# Patient Record
Sex: FEMALE | Race: WHITE | NOT HISPANIC OR LATINO | Employment: FULL TIME | URBAN - METROPOLITAN AREA
[De-identification: names, ages, dates, MRNs, and addresses within clinical notes are randomized per-mention and may not be internally consistent; named-entity substitution may affect disease eponyms.]

---

## 2021-02-16 RX ORDER — TRAZODONE HYDROCHLORIDE 150 MG/1
150 TABLET ORAL
COMMUNITY

## 2021-02-16 RX ORDER — BUPROPION HYDROCHLORIDE 300 MG/1
300 TABLET ORAL DAILY
COMMUNITY

## 2021-02-16 RX ORDER — ROSUVASTATIN CALCIUM 10 MG/1
10 TABLET, COATED ORAL DAILY
COMMUNITY

## 2021-02-16 RX ORDER — LISINOPRIL AND HYDROCHLOROTHIAZIDE 12.5; 1 MG/1; MG/1
1 TABLET ORAL DAILY
COMMUNITY

## 2021-02-16 RX ORDER — MELATONIN
2000 DAILY
COMMUNITY

## 2021-02-16 NOTE — PRE-PROCEDURE INSTRUCTIONS
Pre-Surgery Instructions:   Medication Instructions    buPROPion (WELLBUTRIN XL) 300 mg 24 hr tablet Take as directed    cholecalciferol (VITAMIN D3) 1,000 units tablet Hold for one week prior to surgery    lisinopril-hydrochlorothiazide (PRINZIDE,ZESTORETIC) 10-12 5 MG per tablet Hold morning of surgery    rosuvastatin (CRESTOR) 10 MG tablet Take as directed    traZODone (DESYREL) 150 mg tablet Take as directed      My Surgical Experience    The following information was developed to assist you to prepare for your operation  What do I need to do before coming to the hospital?   Arrange for a responsible person to drive you to and from the hospital    Arrange care for your children at home  Children are not allowed in the recovery areas of the hospital   Plan to wear clothing that is easy to put on and take off  If you are having shoulder surgery, wear a shirt that buttons or zippers in the front  Bathing  o Shower the evening before and the morning of your surgery with an antibacterial soap  Please refer to the Pre Op Showering Instructions for Surgery Patients Sheet   o Remove nail polish and all body piercing jewelry  o Do not shave any body part for at least 24 hours before surgery-this includes face, arms, legs and upper body  Food  o Nothing to eat or drink after midnight the night before your surgery  This includes candy and chewing gum  o Exception: If your surgery is after 12:00pm (noon), you may have clear liquids such as 7-Up®, ginger ale, apple or cranberry juice, Jell-O®, water, or clear broth until 8:00 am  o Do not drink milk or juice with pulp on the morning before surgery  o Do not drink alcohol 24 hours before surgery  Medicine  o Follow instructions you received from your surgeon about which medicines you may take on the day of surgery  o If instructed to take medicine on the morning of surgery, take pills with just a small sip of water   Call your prescribing doctor for specific infroamtion on what to do if you take insulin    What should I bring to the hospital?    Bring:  Arlana Nails or a walker, if you have them, for foot or knee surgery   A list of the daily medicines, vitamins, minerals, herbals and nutritional supplements you take  Include the dosages of medicines and the time you take them each day   Glasses, dentures or hearing aids   Minimal clothing; you will be wearing hospital sleepwear   Photo ID; required to verify your identity   If you have a Living Will or Power of , bring a copy of the documents   If you have an ostomy, bring an extra pouch and any supplies you use    Do not bring   Medicines or inhalers   Money, valuables or jewelry    What other information should I know about the day of surgery?  Notify your surgeons if you develop a cold, sore throat, cough, fever, rash or any other illness   Report to the Ambulatory Surgical/Same Day Surgery Unit   You will be instructed to stop at Registration only if you have not been pre-registered   Inform your  fi they do not stay that they will be asked by the staff to leave a phone number where they can be reached   Be available to be reached before surgery  In the event the operating room schedule changes, you may be asked to come in earlier or later than expected    *It is important to tell your doctor and others involved in your health care if you are taking or have been taking any non-prescription drugs, vitamins, minerals, herbals or other nutritional supplements   Any of these may interact with some food or medicines and cause a reaction

## 2021-02-24 ENCOUNTER — HOSPITAL ENCOUNTER (OUTPATIENT)
Facility: HOSPITAL | Age: 56
Setting detail: OUTPATIENT SURGERY
Discharge: HOME | End: 2021-02-24
Attending: PLASTIC SURGERY | Admitting: PLASTIC SURGERY
Payer: COMMERCIAL

## 2021-02-24 ENCOUNTER — ANESTHESIA (OUTPATIENT)
Dept: PERIOP | Facility: HOSPITAL | Age: 56
End: 2021-02-24
Payer: COMMERCIAL

## 2021-02-24 ENCOUNTER — ANESTHESIA EVENT (OUTPATIENT)
Dept: PERIOP | Facility: HOSPITAL | Age: 56
End: 2021-02-24
Payer: COMMERCIAL

## 2021-02-24 VITALS
OXYGEN SATURATION: 97 % | RESPIRATION RATE: 16 BRPM | WEIGHT: 189 LBS | DIASTOLIC BLOOD PRESSURE: 56 MMHG | SYSTOLIC BLOOD PRESSURE: 110 MMHG | BODY MASS INDEX: 31.49 KG/M2 | HEART RATE: 58 BPM | HEIGHT: 65 IN | TEMPERATURE: 97.6 F

## 2021-02-24 VITALS — HEART RATE: 75 BPM

## 2021-02-24 RX ORDER — FENTANYL CITRATE/PF 50 MCG/ML
50 SYRINGE (ML) INJECTION
Status: DISCONTINUED | OUTPATIENT
Start: 2021-02-24 | End: 2021-02-24 | Stop reason: HOSPADM

## 2021-02-24 RX ORDER — LIDOCAINE HYDROCHLORIDE AND EPINEPHRINE 5; 5 MG/ML; UG/ML
INJECTION, SOLUTION INFILTRATION; PERINEURAL AS NEEDED
Status: DISCONTINUED | OUTPATIENT
Start: 2021-02-24 | End: 2021-02-24 | Stop reason: HOSPADM

## 2021-02-24 RX ORDER — LIDOCAINE HYDROCHLORIDE 10 MG/ML
INJECTION, SOLUTION EPIDURAL; INFILTRATION; INTRACAUDAL; PERINEURAL AS NEEDED
Status: DISCONTINUED | OUTPATIENT
Start: 2021-02-24 | End: 2021-02-24

## 2021-02-24 RX ORDER — MIDAZOLAM HYDROCHLORIDE 2 MG/2ML
INJECTION, SOLUTION INTRAMUSCULAR; INTRAVENOUS AS NEEDED
Status: DISCONTINUED | OUTPATIENT
Start: 2021-02-24 | End: 2021-02-24

## 2021-02-24 RX ORDER — MAGNESIUM HYDROXIDE 1200 MG/15ML
LIQUID ORAL AS NEEDED
Status: DISCONTINUED | OUTPATIENT
Start: 2021-02-24 | End: 2021-02-24 | Stop reason: HOSPADM

## 2021-02-24 RX ORDER — PROPOFOL 10 MG/ML
INJECTION, EMULSION INTRAVENOUS AS NEEDED
Status: DISCONTINUED | OUTPATIENT
Start: 2021-02-24 | End: 2021-02-24

## 2021-02-24 RX ORDER — SODIUM CHLORIDE, SODIUM LACTATE, POTASSIUM CHLORIDE, CALCIUM CHLORIDE 600; 310; 30; 20 MG/100ML; MG/100ML; MG/100ML; MG/100ML
INJECTION, SOLUTION INTRAVENOUS CONTINUOUS PRN
Status: DISCONTINUED | OUTPATIENT
Start: 2021-02-24 | End: 2021-02-24

## 2021-02-24 RX ORDER — GINSENG 100 MG
CAPSULE ORAL AS NEEDED
Status: DISCONTINUED | OUTPATIENT
Start: 2021-02-24 | End: 2021-02-24 | Stop reason: HOSPADM

## 2021-02-24 RX ORDER — FENTANYL CITRATE 50 UG/ML
INJECTION, SOLUTION INTRAMUSCULAR; INTRAVENOUS AS NEEDED
Status: DISCONTINUED | OUTPATIENT
Start: 2021-02-24 | End: 2021-02-24

## 2021-02-24 RX ORDER — DEXAMETHASONE SODIUM PHOSPHATE 4 MG/ML
INJECTION, SOLUTION INTRA-ARTICULAR; INTRALESIONAL; INTRAMUSCULAR; INTRAVENOUS; SOFT TISSUE AS NEEDED
Status: DISCONTINUED | OUTPATIENT
Start: 2021-02-24 | End: 2021-02-24

## 2021-02-24 RX ORDER — ONDANSETRON 2 MG/ML
4 INJECTION INTRAMUSCULAR; INTRAVENOUS ONCE AS NEEDED
Status: DISCONTINUED | OUTPATIENT
Start: 2021-02-24 | End: 2021-02-24 | Stop reason: HOSPADM

## 2021-02-24 RX ORDER — SODIUM CHLORIDE, SODIUM LACTATE, POTASSIUM CHLORIDE, CALCIUM CHLORIDE 600; 310; 30; 20 MG/100ML; MG/100ML; MG/100ML; MG/100ML
100 INJECTION, SOLUTION INTRAVENOUS CONTINUOUS
Status: DISCONTINUED | OUTPATIENT
Start: 2021-02-24 | End: 2021-02-24 | Stop reason: HOSPADM

## 2021-02-24 RX ORDER — ONDANSETRON 2 MG/ML
INJECTION INTRAMUSCULAR; INTRAVENOUS AS NEEDED
Status: DISCONTINUED | OUTPATIENT
Start: 2021-02-24 | End: 2021-02-24

## 2021-02-24 RX ORDER — SODIUM CHLORIDE, SODIUM LACTATE, POTASSIUM CHLORIDE, CALCIUM CHLORIDE 600; 310; 30; 20 MG/100ML; MG/100ML; MG/100ML; MG/100ML
125 INJECTION, SOLUTION INTRAVENOUS CONTINUOUS
Status: DISCONTINUED | OUTPATIENT
Start: 2021-02-24 | End: 2021-02-24 | Stop reason: HOSPADM

## 2021-02-24 RX ORDER — HYDROCODONE BITARTRATE AND ACETAMINOPHEN 5; 325 MG/1; MG/1
1 TABLET ORAL EVERY 6 HOURS PRN
Status: DISCONTINUED | OUTPATIENT
Start: 2021-02-24 | End: 2021-02-24 | Stop reason: HOSPADM

## 2021-02-24 RX ORDER — CEFAZOLIN SODIUM 2 G/50ML
SOLUTION INTRAVENOUS AS NEEDED
Status: DISCONTINUED | OUTPATIENT
Start: 2021-02-24 | End: 2021-02-24

## 2021-02-24 RX ORDER — PROPOFOL 10 MG/ML
INJECTION, EMULSION INTRAVENOUS CONTINUOUS PRN
Status: DISCONTINUED | OUTPATIENT
Start: 2021-02-24 | End: 2021-02-24

## 2021-02-24 RX ADMIN — CEFAZOLIN SODIUM 2000 MG: 2 SOLUTION INTRAVENOUS at 13:12

## 2021-02-24 RX ADMIN — DEXAMETHASONE SODIUM PHOSPHATE 4 MG: 4 INJECTION, SOLUTION INTRA-ARTICULAR; INTRALESIONAL; INTRAMUSCULAR; INTRAVENOUS; SOFT TISSUE at 13:40

## 2021-02-24 RX ADMIN — SODIUM CHLORIDE, SODIUM LACTATE, POTASSIUM CHLORIDE, AND CALCIUM CHLORIDE: .6; .31; .03; .02 INJECTION, SOLUTION INTRAVENOUS at 13:03

## 2021-02-24 RX ADMIN — FENTANYL CITRATE 50 MCG: 50 INJECTION INTRAMUSCULAR; INTRAVENOUS at 13:27

## 2021-02-24 RX ADMIN — LIDOCAINE HYDROCHLORIDE 50 MG: 10 INJECTION, SOLUTION EPIDURAL; INFILTRATION; INTRACAUDAL; PERINEURAL at 13:33

## 2021-02-24 RX ADMIN — ONDANSETRON HYDROCHLORIDE 4 MG: 2 INJECTION, SOLUTION INTRAMUSCULAR; INTRAVENOUS at 13:40

## 2021-02-24 RX ADMIN — MIDAZOLAM HYDROCHLORIDE 2 MG: 1 INJECTION, SOLUTION INTRAMUSCULAR; INTRAVENOUS at 13:27

## 2021-02-24 RX ADMIN — PHENYLEPHRINE HYDROCHLORIDE 100 MCG: 10 INJECTION INTRAVENOUS at 13:57

## 2021-02-24 RX ADMIN — FENTANYL CITRATE 50 MCG: 50 INJECTION INTRAMUSCULAR; INTRAVENOUS at 13:33

## 2021-02-24 RX ADMIN — PROPOFOL 120 MCG/KG/MIN: 10 INJECTION, EMULSION INTRAVENOUS at 13:33

## 2021-02-24 RX ADMIN — PROPOFOL 50 MG: 10 INJECTION, EMULSION INTRAVENOUS at 13:33

## 2021-02-24 NOTE — DISCHARGE INSTRUCTIONS
Care For Your Stitches   AMBULATORY CARE:   Stitches,  or sutures, are used to close cuts and wounds on the skin  Stitches need to be removed after your wound has healed  Seek care immediately if:   · Your stitches come apart  · Blood soaks through your bandages  · You suddenly cannot move your injured joint  · You have sudden numbness around your wound  · You see red streaks coming from your wound  Contact your healthcare provider if:   · You have a fever and chills  · Your wound is red, warm, swollen, or leaking pus  · There is a bad smell coming from your wound  · You have increased pain in the wound area  · You have questions or concerns about your condition or care  Care for your stitches:   · Protect the stitches  You may need to cover your stitches with a bandage for 24 to 48 hours, or as directed  Do not bump or hit the suture area  This could open the wound  Do not trim or shorten the ends of your stitches  If they rub on your clothing, put a gauze bandage between the stitches and your clothes  · Clean the area as directed  Carefully wash your wound with soap and water  For mouth and lip wounds, rinse your mouth after meals and at bedtime  Ask your healthcare provider what to use to rinse your mouth  If you have a scalp wound, you may gently wash your hair every 2 days with mild shampoo  Do not use hair products, such as hair spray  Check your wound for signs of infection when you clean it  Signs include redness, swelling, and pus  · Keep the area dry as directed  Wait 12 to 24 hours after you receive your stitches before you take a shower  Take showers instead of baths  Do not take a bath or swim  Your healthcare provider will give you instructions for bathing with your stitches  Help your wound heal:   · Elevate your wound  Keep your wound above the level of your heart as often as you can  This will help decrease swelling and pain   Prop the area on pillows or blankets, if possible, to keep it elevated comfortably  · Limit activity  Do not stretch the skin around your wound  This will help prevent bleeding and swelling  Follow up with your healthcare provider as directed: You may need to return to have your stitches removed  Write down your questions so you remember to ask them during your visits  © Copyright [address] Information is for End User's use only and may not be sold, redistributed or otherwise used for commercial purposes  All illustrations and images included in CareNotes® are the copyrighted property of A D A M , Inc  or [facility] Ernst Alcantara   The above information is an  only  It is not intended as medical advice for individual conditions or treatments  Talk to your doctor, nurse or pharmacist before following any medical regimen to see if it is safe and effective for you

## 2021-02-24 NOTE — ANESTHESIA POSTPROCEDURE EVALUATION
Post-Op Assessment Note    CV Status:  Stable    Pain management: adequate     Mental Status:  Alert and awake   Hydration Status:  Stable   PONV Controlled:  None   Airway Patency:  Patent      Post Op Vitals Reviewed: Yes      Staff: CRNA         No complications documented      BP      Temp     Pulse     Resp      SpO2

## 2021-02-24 NOTE — ANESTHESIA PREPROCEDURE EVALUATION
Procedure:  REPAIR MOH'S DEFECT OF NOSE (N/A Nose)    Relevant Problems   CARDIO   (+) Hyperlipidemia   (+) Hypertension        Physical Exam    Airway    Mallampati score: II  TM Distance: >3 FB  Neck ROM: full     Dental   No notable dental hx     Cardiovascular  Cardiovascular exam normal    Pulmonary  Pulmonary exam normal     Other Findings        Anesthesia Plan  ASA Score- 2     Anesthesia Type- IV sedation with anesthesia with ASA Monitors  Additional Monitors:   Airway Plan:           Plan Factors-Exercise tolerance (METS): >4 METS  Chart reviewed  Patient is not a current smoker  Patient not instructed to abstain from smoking on day of procedure  Patient did not smoke on day of surgery  Induction- intravenous  Postoperative Plan-     Informed Consent- Anesthetic plan and risks discussed with patient  I personally reviewed this patient with the CRNA  Discussed and agreed on the Anesthesia Plan with the CRNA  Bogdan Dawkins

## 2021-02-24 NOTE — OP NOTE
OPERATIVE REPORT  PATIENT NAME: Dale Ruiz    :  1965  MRN: [id number]  Pt Location:  OR ROOM 08    SURGERY DATE: 2021    Surgeon(s) and Role:     * Chastity Alejandro MD - Primary    Preop and postoperative Diagnosis:  Mohs defect left ala base    Operative Procedure(s) (LRB):  REPAIR MOH'S DEFECT OF NOSE (N/A) with V-Y left upper lip advancement flap    Operative history :  The patient had Mohs surgery to remove a basal cell carcinoma from the left ala base  This extended a little bit into the upper lip leaving a 15 mm diameter defect  To close this V to Y advancement flap of the upper lip proved satisfactory  Operative procedure:  Patient was taken to the operating placed supine on operating table  She was prepped and draped in usual fashion  Anesthesia was general supplemented xylocaine 1% with epinephrine  Hemostasis in the Mohs defect was achieved the bipolar  The v-shaped flap was then marked below this with the lateral edge of the triangular shaped flap corresponding to the nasolabial crease  The base of the triangle was equal to the diameter of the defect  The defect extended almost to the left oral commissure  The size of the flap were incised  Subcutaneous tissues were spread  Hemostasis carefully achieved the bipolar  The flap could then easily be advanced superiorly to close the defect  The base of the flap was sewn to the upper part of the defect along the left ala base using 4-0 nylon interrupted simple sutures  The sides of the flap were sewn to the upper lip and left nasolabial crease respectfully with 4-O nylon interrupted simple sutures  The donor area of the flap below the apex was closed directly bring the upper lip to the nasolabial crease a 4 interrupted simple sutures  Light dressings were applied  The patient tolerated procedure well taken to recovery in good condition        SIGNATURE: Chastity Alejandro MD  DATE: 2021  TIME: 2:18 PM

## 2022-05-18 ENCOUNTER — OFFICE VISIT (OUTPATIENT)
Dept: BARIATRICS | Facility: CLINIC | Age: 57
End: 2022-05-18
Payer: COMMERCIAL

## 2022-05-18 VITALS
DIASTOLIC BLOOD PRESSURE: 70 MMHG | HEART RATE: 74 BPM | BODY MASS INDEX: 35.87 KG/M2 | SYSTOLIC BLOOD PRESSURE: 120 MMHG | HEIGHT: 64 IN | WEIGHT: 210.1 LBS | RESPIRATION RATE: 16 BRPM

## 2022-05-18 DIAGNOSIS — Z91.89 AT RISK FOR SLEEP APNEA: ICD-10-CM

## 2022-05-18 DIAGNOSIS — E66.9 OBESITY, CLASS II, BMI 35-39.9: Primary | ICD-10-CM

## 2022-05-18 DIAGNOSIS — I10 HYPERTENSION: ICD-10-CM

## 2022-05-18 DIAGNOSIS — E78.5 HYPERLIPIDEMIA: ICD-10-CM

## 2022-05-18 PROCEDURE — 99204 OFFICE O/P NEW MOD 45 MIN: CPT | Performed by: NURSE PRACTITIONER

## 2022-05-18 NOTE — PROGRESS NOTES
Assessment/Plan:  Treasure was seen today for consult  Diagnoses and all orders for this visit:    Obesity, Class II, BMI 35-39 9  - Discussed options of [facility]-Intensive Lifestyle Intervention Program, Very Low Calorie Diet-VLCD, Conservative Program, Vinicio-En-Y Gastric Bypass and Vertical Sleeve Gastrectomy and the role of weight loss medications  - Explained the importance of making lifestyle changes first before starting any anti-obesity medications  Patient should demonstrate lifestyle changes first before anti-obesity medication can be initiated  - Patient is interested in pursuing [facility]-Intensive Lifestyle Intervention Program  - Initial weight loss goal of 5-10% weight loss for improved health  - Weight loss can improve patient's co-morbid conditions and/or prevent weight-related complications  - Stop Tran Line 5/8  - Labs reviewed: CMP, lipid panel, and TSH from 11/18/2021  Chol and LDL elevated, which should improve with weight loss and lifestyle modifications  Otherwise, labs within acceptable range  - check A1C and fasting insulin  - Already on Wellbutrin through primary care provider    - Weight loss medications discussed  Consider Saxenda  She will check coverage and update me  - Patient denies personal history of pancreatitis  Patient also denies personal or family history of medullary thyroid cancer and multiple endocrine neoplasia type 2 (MEN 2 tumor)  Goals:  Do not skip meals  Food log (ie ) www myfitnesspal com,sparkpeople  com,loseit com,calorieking  com,etc  baritastic (use skinnytaste  com, dietdoctor  com or smartphone brooke [employer] for recipes)  No sugary beverages  At least 64oz of water daily  Increase physical activity by 10 minutes daily   Gradually increase physical activity to a goal of 5 days per week for 30 minutes of MODERATE intensity PLUS 2 days per week of FULL BODY resistance training (use smartphone apps [employer], Home Workout, etc ) Start walking 10 minutes a day 2-3 times per week  - Keep up the great water intake  - Reduce fried foods  Hypertension  - Taking lisinopril-HCTZ  Continue management with prescribing provider  Hyperlipidemia  - Likely will improve with weight loss  At risk for sleep apnea  - Stop So Fang 5/8  - Risks of untreated sleep apnea reviewed, including increased risk for myocardial infarction and stroke, increased difficulty with weight loss, and risk of sudden cardiac death due to arrhythmia  Ambulatory referral to Sleep Medicine placed  Follow up in approximately nursing home through [facility] with Non-Surgical Physician/Advanced Practitioner  Subjective:   Chief Complaint   Patient presents with    Consult     MWM consult; waist 41 5; goal wt 150; stop bang 5-8       Patient ID: Moreno Zambrano  is a 56 y o  female with excess weight/obesity here to pursue weight management  Previous notes and records have been reviewed  Past Medical History:   Diagnosis Date    Hyperlipidemia     Hypertension      Past Surgical History:   Procedure Laterality Date    BASAL CELL CARCINOMA EXCISION Left     nose    BELPHAROPTOSIS REPAIR      HAND SURGERY      HYSTERECTOMY      MOHS RECONSTRUCTION N/A 02/24/2021    Procedure: REPAIR MOH'S DEFECT OF NOSE;  Surgeon: Edgar Duff MD;  [facility] MA[facility];  Service: Plastics    TUBAL LIGATION      WISDOM TOOTH EXTRACTION      WRIST SURGERY         HPI:  Wt Readings from Last 20 Encounters:   05/18/22 95 3 kg (210 lb 1 6 oz)   02/24/21 85 7 kg (189 lb)     Obesity/Excess Weight:  Severity: Moderate  Onset:  Since 2004    Modifiers: Diet and Exercise and Commercial Weight Loss Programs-ie   Weight Watchers, Amira Ronde, Nutrisystem, etc   Contributing factors: Poor Food Choices and Insufficient Physical Activity  Associated symptoms: fatigue, increased shortness of breath and clothes do not fit    Hydration: 5 - 24 oz water, 1 5 cups of coffee with 2% milk, 1 cup unsweet iced tea, rare coke zero  Alcohol: 1 drink per week  Smoking: denies  Exercise: none  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  Sleep: 6 hours or less  STOP ban/8    Highest weight: 242 lbs in 2019  Current weight: 210 1 lbs  Goal weight: 150 lbs    Colonoscopy: declined referral, has cologuard from PCP  Mammogram: UTD    The following portions of the patient's history were reviewed and updated as appropriate: allergies, current medications, past family history, past medical history, past social history, past surgical history, and problem list     Review of Systems   HENT: Negative for sore throat  Respiratory: Negative for cough and shortness of breath  Cardiovascular: Negative for chest pain and palpitations  Gastrointestinal: Negative for constipation, diarrhea, nausea and vomiting         + GERD - controlled with PRN medication and diet controlled   Endocrine: Negative for cold intolerance and heat intolerance  Genitourinary: Negative for dysuria  Musculoskeletal: Positive for arthralgias  Negative for back pain  Skin: Negative for rash  Neurological: Negative for headaches  Psychiatric/Behavioral: Negative for suicidal ideas (or HI)  Denies depression   + anxiety - situational        Objective:  /70   Pulse 74   Resp 16   Ht 5' 4" (1 626 m)   Wt 95 3 kg (210 lb 1 6 oz)   Breastfeeding No   BMI 36 06 kg/m²     Physical Exam  Vitals and nursing note reviewed  Constitutional   General appearance: Abnormal   well developed and obese  Eyes No conjunctival injection  Ears, Nose, Mouth, and Throat Oral mucosa moist    Pulmonary   Respiratory effort: No increased work of breathing or signs of respiratory distress  Cardiovascular     Examination of extremities for edema and/or varicosities: Normal   no edema  Abdomen   Abdomen: Abnormal   The abdomen was obese      Musculoskeletal   Gait and station: Normal     Psychiatric   Orientation to person, place and time: Normal     Affect: appropriate

## 2024-02-16 ENCOUNTER — TELEPHONE (OUTPATIENT)
Age: 59
End: 2024-02-16

## 2024-02-16 NOTE — TELEPHONE ENCOUNTER
Caller: Patient     Doctor: Lachman     Reason for call: New Patient ref by ortho Dr. Jose Jimenez patient states he is going to call to have patient referred to our office to see if patient can be seen for Torn Tendon patient states she's had her EKG done blood work and medical clearance from her primary doctor, and  weight bearing x-rays done and an Mri done at [facility] she states she was supposed to have surgery done and it was denied   Please advise      Call back#: [phone]

## 2024-02-19 ENCOUNTER — TELEPHONE (OUTPATIENT)
Age: 59
End: 2024-02-19

## 2024-02-19 NOTE — TELEPHONE ENCOUNTER
Called and left a message for the patient to call back to schedule and appt for tomorrow 2/20/24.   Please schedule her if she calls back.

## 2024-02-19 NOTE — TELEPHONE ENCOUNTER
Caller: Treasure    Doctor: [facility]    Reason for call: Returning call//Patient doesn't understand why she received call for [facility].  Please call patient   Call back#: [phone]

## 2024-02-19 NOTE — TELEPHONE ENCOUNTER
I am at [facility] today helping out another doctor that is why she got a call from [facility].

## 2024-02-20 ENCOUNTER — APPOINTMENT (OUTPATIENT)
Dept: RADIOLOGY | Facility: AMBULARY SURGERY CENTER | Age: 59
End: 2024-02-20
Attending: ORTHOPAEDIC SURGERY
Payer: COMMERCIAL

## 2024-02-20 ENCOUNTER — OFFICE VISIT (OUTPATIENT)
Dept: OBGYN CLINIC | Facility: CLINIC | Age: 59
End: 2024-02-20
Payer: COMMERCIAL

## 2024-02-20 VITALS
SYSTOLIC BLOOD PRESSURE: 112 MMHG | HEART RATE: 84 BPM | BODY MASS INDEX: 34.66 KG/M2 | DIASTOLIC BLOOD PRESSURE: 71 MMHG | HEIGHT: 64 IN | WEIGHT: 203 LBS

## 2024-02-20 DIAGNOSIS — M76.822 POSTERIOR TIBIAL TENDON DYSFUNCTION (PTTD) OF LEFT LOWER EXTREMITY: ICD-10-CM

## 2024-02-20 DIAGNOSIS — M76.822 POSTERIOR TIBIAL TENDON DYSFUNCTION (PTTD) OF LEFT LOWER EXTREMITY: Primary | ICD-10-CM

## 2024-02-20 PROCEDURE — 73620 X-RAY EXAM OF FOOT: CPT

## 2024-02-20 PROCEDURE — 73630 X-RAY EXAM OF FOOT: CPT

## 2024-02-20 PROCEDURE — 99203 OFFICE O/P NEW LOW 30 MIN: CPT | Performed by: ORTHOPAEDIC SURGERY

## 2024-02-20 PROCEDURE — 73600 X-RAY EXAM OF ANKLE: CPT

## 2024-02-20 RX ORDER — CEFAZOLIN SODIUM 2 G/50ML
2000 SOLUTION INTRAVENOUS ONCE
Status: CANCELLED | OUTPATIENT
Start: 2024-02-26 | End: 2024-02-20

## 2024-02-20 RX ORDER — CHLORHEXIDINE GLUCONATE ORAL RINSE 1.2 MG/ML
15 SOLUTION DENTAL ONCE
Status: CANCELLED | OUTPATIENT
Start: 2024-02-26 | End: 2024-02-20

## 2024-02-20 RX ORDER — CHLORHEXIDINE GLUCONATE 4 G/100ML
SOLUTION TOPICAL DAILY PRN
Status: CANCELLED | OUTPATIENT
Start: 2024-02-26

## 2024-02-20 NOTE — PATIENT INSTRUCTIONS
Flatfoot Surgical Correction     What is adult flatfoot?  Adult flatfoot is a condition that causes flattening of the arch of the foot.       X-ray views of a flatfoot before and after surgery. This patient had a first tarsal-metatarsal fusion, a medializing calcaneal osteotomy and a lateral column lengthening.            What are the goals of flatfoot surgical correction?  The goal of surgical correction is to improve alignment of the foot. This allows for more normal pressures during standing and walking. A combination of procedures is performed to repair the ligaments and tendons that support the arch. Bone cuts are often made to help restore the arch. Proper correction of flatfoot deformity can often help to improve pain and walking ability.      What signs indicate surgery may be needed?  Patients with flatfoot frequently describe ankle pain and difficulty with daily activities. Surgical reconstruction of the flatfoot is performed in patients with an arch collapse that is still flexible (not stiff). An orthopaedic foot and ankle surgeon should do a complete evaluation of the foot. This includes a medical history, physical exam and  X-rays. A trial of non-operative treatment should be completed prior to any decision to have surgery. Treatments can include rest, immobilization, shoe inserts, braces and physical therapy. If these are unsuccessful, surgery can be considered.     When should I avoid surgery?  Patients who have diabetes or take oral steroids should be evaluated by their primary care physician. These conditions may prevent you from being able to safely have surgery. Obese patients and smokers are at higher risk for blood clots and wound problems. Full recovery from flatfoot surgery can take up to a year. Patients who are unable or unwilling to complete this process should not have this surgery.     General Details of Procedure  A combination of surgical procedures can be used to reconstruct the  flatfoot. Generally, these procedures can be  into those that correct deformities of the bones and those that repair ligaments and tendons. Your orthopaedic surgeon will choose the proper combination of procedures for your foot.     Surgery of the foot can be performed under regional anesthesia, which is numbing the foot and ankle with a nerve or spinal block, or general anesthesia, which may require a breathing tube. A nerve block is often placed behind the knee to reduce pain after surgery.     Specific Techniques  Medializing Calcaneal Osteotomy  A medializing calcaneal osteotomy (heel slide) procedure is often used when the calcaneus (heel bone) has shifted out from underneath the leg. An incision is made on the outside of the heel, and the back half of the heel bone is cut and slid back underneath the leg. The heel is then fixed in place using metal screws or a plate.     Lateral Column Lengthening  Outward rotation of the foot may occur in patients with flatfoot. A lateral column lengthening procedure is sometimes performed for these patients. An incision is made on the outside of the foot, and the front half of the heel bone is cut. A bone wedge is then placed into the cut area of the heel bone. This wedge helps to “lengthen” the heel bone and rotate the foot back into its correct position. The wedge is usually kept in place using screws or a plate. The wedge can be taken from a cadaver or from a patient’s own hip.     Medial Cuneiform Dorsal Opening Wedge Osteotomy or First Tarsal-Metatarsal Fusion  Arch collapse can lead to the big toe side of the foot being raised above the ground. Your surgeon may perform a dorsal       X-ray views of a flatfoot before and after surgery. This patient had a first tarsal-metatarsal fusion, a medializing calcaneal osteotomy and a lateral column lengthening.     opening wedge osteotomy of the medial cuneiform bone to treat this problem. An alternative is to perform a  first tarsal-metatarsal joint fusion. Both procedures involve an incision over the top of the foot. In the case of the dorsal opening wedge osteotomy, a bone wedge is placed into the top portion of the bone to push it down toward the floor. In the case of the fusion, the bone is pushed down toward the floor at the level of a joint in the middle of the foot and the bones are fused into that position. Screws or a plate can be used to keep the wedge in place or to fuse the joint.     Tendon and Ligament Procedures  The posterior tibial tendon runs underneath the arch of the foot. It is often stretched and dysfunctional in patients with flatfoot. The tendon often requires removal if it is thickened or torn. Usually the tendon that bends the little toes can be transferred (rerouted) to help support the arch. The stresses placed on the flatfoot can lead to tearing of the ligaments that support the arch (spring ligament) and the inside of the ankle (deltoid ligament). Your surgeon may decide to repair these structures if significant damage has been done. Finally, the flatfoot condition is often associated with tightness of the Achilles tendon. This can be treated using a lengthening procedure to stretch the muscle fibers of the calf.      Double or Triple Arthrodesis  In the later stages of flatfoot, deformities are frequently inflexible (stiff). Arthritis of the foot may be present as well. Surgical correction of these severe cases requires fusion of one or more of the foot joints. This procedure is referred to as a double or triple arthrodesis depending on the number of joints fused. For more information, see the Triple Arthrodesis page.     What happens after surgery?  Patients may go home the day of surgery or they may require an overnight hospital stay. The leg will be placed in a splint or cast and should be kept elevated for the first two weeks. At that point, sutures are removed. A new cast or a removable boot is  then placed. It is important that patients do not put any weight on the corrected foot for six to eight weeks following the operation. Patients may begin bearing weight at eight weeks and usually progress to full weightbearing by 10 to 12 weeks. For some patients, weightbearing requires additional time. After 12 weeks, patients commonly can transition to wearing a shoe. Inserts and ankle braces are often used. Physical therapy may be recommended.     Potential Complications  There are complications that relate to surgery in general. These include the risks associated with anesthesia, infection, damage to nerves and blood vessels, and bleeding or blood clots.     Complications following flatfoot surgery may include wound breakdown or nonunion (incomplete healing of the bones). These complications often can be prevented with proper wound care and rehabilitation. Occasionally, patients may notice some discomfort due to prominent hardware. Removal of hardware can be done at a later time if this is an issue. The overall complication rates for flatfoot surgery are low.     Frequently Asked Questions  Will surgical correction of my flatfoot improve the cosmetic appearance of my foot?  Surgical correction of flatfoot is aimed primarily at reducing pain and restoring function. Although surgery will likely improve the cosmetic appearance of the foot, it is not one of the primary goals of treatment.     What activities will I be able to do following flatfoot surgery?  With proper correction and rehabilitation, many patients return to active lifestyles. Activities such as walking, biking, driving and even golfing are well tolerated. It is less likely, however, that patients will be able to participate in very strenuous activities requiring running, cutting or jumping.               James R Lachman, M.D.  Attending, Orthopaedic Surgery  [facility] Office Phone: [phone] ? Fax:  [phone]  [place] Office Phone: [phone] ? Fax:[phone]    : Katya Martin MA     Surgery Coordinators [place]: Janice Leblanc, [phone]  Flores Fernández, [phone]  Surgery Coordinator [place]:  Poppy Altamirano, [phone]                                                       Sydni Iverson, [phone]                                                            [email or web address]   PRE-OPERATIVE AND POST-OPERATIVE INSTRUCTIONS    General Information:  Your surgery is with Dr. Lachman.  Dates can change (although rare) depending on emergencies.  Typical post operative visits are at the following intervals:  3 weeks post surgery(except 1 week for bunions and wound monitoring), 6 weeks post surgery, 3 months post surgery, 6 months post surgery, and then on a yearly basis.  However, this may change based on Dr. Lachmans’ recommendation.  #1 post-operative rule for foot/ankle surgery:  ONCE YOU ARE OUT OF YOUR CAST AND/OR REMOVABLE BOOT, SWELLING MAY PERSIST FOR MANY MONTHS.  YOU MIGHT ALSO EXPERIENCE A BLUISH DISCOLORATION OF YOUR LEG.  THIS IS NORMAL AND PART OF THE USUAL POSTOPERATIVE EXPERIENCE.    SMOKING:  Smoking results in incomplete healing of fractures (broken bones) and joints that my have been fused.  Smoking and nicotine also prevents the growth of bone into ankle replacements and bone healing.  It also slows the healing of muscles and skin (soft tissue).  Therefore, please do not have surgery if you continue to smoke.  We reserve the right to cancel your surgery if we suspect that you are smoking.  DO NOT use nicorette gum or other patches.  Please find an alternative method to quit smoking before your surgery.    Pre-Operative Information:  Surgery date and preoperative visits:  If you have medical problems, such as an abnormal EKG, history of BLOOD CLOT, ANEURYSM, and any other heart condition, please inform us so that we can  get your medical clearance several weeks before the surgery.  Please bring any important medical information, such as an EKG, chest x-ray, or echocardiogram, with you to ensure that your surgery will not be delayed.  If needed, you will receive your preoperative appointments in the mail or by phone from our scheduling office.  The location of the preoperative appointment will be given to you also.  You may not eat after midnight the night before surgery.  If you do, your surgery will be cancelled.   You will receive a phone call from your surgery center the day before your surgery (if your surgery is on a Monday, you will get a call the Friday before).   If you do not hear from someone by 4pm the day before your surgery, please call the Surgical coordinator (number above) to notify us.  Start taking Vitamin D3 4000 units per day and Calcium 1200mg per day immediately. You will continue this until your 3 month post-op visit. These are over the counter and available at all pharmacies and supermarkets.  FOR THOSE HAVING SURGERY AT [facility]- IF YOU WILL NEED CRUTCHES OR A ROLLING WALKER AFTER SURGERY, ASK FOR A PRESCRIPTION FOR THIS FROM OUR OFFICE TODAY.  THIS CANNOT BE HANDLED THE DAY OF SURGERY AS [facility] DOES NOT STOCK THESE.    Because bacterial can often enter any defect in the skin, it is important to avoid any cuts before surgery.  Any breaks in the skin on the leg will often result in your surgery being postponed.  Please avoid going on a very long walk the day prior to surgery, or doing other activities that could lead to irritation of the skin, including yard work, extra athletic activity, or shaving.   This could result in surgery cancellation.  You MUST be fasting the day of your surgery.  Therefore, please do not consume any foot or beverage after midnight the night before surgery.  The morning of surgery you may take your usual medications with a sip of water.  It is important  not to take anti-inflammatory medication like Ibuprofen, Motrin, Naproxen (Aleve), or Aspirin 7-10 days before surgery because they will make you bleed more than usual.  Vitamin, E, Plavix and Coumadin also have the same effect.  Stop Aspirin and Vitamin E two weeks before surgery.  YOUR MEDICAL DOCTOR SHOULD TELL YOU WHEN TO STOP COUMADIN OR PLAVIX.  If your surgery involves any bone healing, please do not take anti-inflammatories for at least 6 weeks after surgery.  This can impede bone healing (ibuprofen, Aleve, Relafen, iodine).  Tylenol is fine to take.    PREOPERATIVE BATHING INSTRUCTIONS:    Before your surgery, bathe with Hibiclens (4% Chlorhexidene) as instructed below.  This skin cleanser will help reduce the bacteria on your skin before surgery.  To avoid irritating your eyes, do not apply Hibiclens above the level of your neck.  On the evening before AND the morning of surgery, bathe your entire body except the face and scalp, then rinse freely.  DO NOT apply to your face or scalp, as Hibiclens can irritate your eyes.  Purchasing information:   Hibiclens is available without a prescription at most retail pharmacies.     ADDITIONAL INSTRUCTIONS:  PATIENTS HAVING FOOT/ANKLE SURGERY     In preparation for your upcoming surgery, we kindly request and advise the following:  Notify our office if you are taking any of the following:  Coumadin (warfarin):  Persantine (dipyridamole); Pletal (cilostazol); Plavix (clopidogrel); Ticlid (ticlopidine); Agrylin (anagrelide); Aggrenox (dipyridamole and aspirin) or other blood thinners,.  In addition, stop taking Vitamin E and herbal supplements.  Do not schedule any elective dental work for at least 6 months after surgery.  If you had an ankle replacement, you will need to take antibiotics before any future dental procedures. Your dentist or our office can prescribe these for you.  1000mg of Amoxicillin 1 hour prior to any dental procedure is the recommended  dosing.      THREE RULES:    After surgery you will most likely be given the instructions “KEEP YOUR TOES ABOVE YOUR NOSE.”  This means that you MUST have your feet elevated higher than your heart.  Keeping your toes above your nose helps to heal the muscles and skin (soft tissues) by reducing swelling in your leg.  This position also helps to prevent infection, and is very important in avoiding deep venous thrombosis (blood clots).    In order to keep the blood circulating in your legs and in order to avoid deep vein   thrombosis (blood clots), we ask patients to GET UP ONCE AN HOUR during the day.  This means you should at least cross the room and come back.  It does not mean you have to be up for long periods of time.  In most cases we will not have people immediately put any weight on their operated part.  This is important to prevent loosening of metal or other devices holding the bones together.  It also prevents irritation of the soft tissues which can lead to prolonged healing.  When we say get up once an hour, please walk, hop or move with an assisted device.  This is important!    Do not do any excessive walking during the first few days after surgery.  Recovering from surgery is a full-time task for the patient.  Postoperative care is important to avoid irritating the skin incision, which can lead to infection.  Please do not plan activities or go out of town for several weeks after surgery.  If you are unsure about your future activities, please schedule surgery only when you know it is acceptable for you.  Scheduling surgery and then canceling the date, prevents other people from having surgery on that date as it takes time to line everything up effectively.  If you cancel your surgery the week of your planned surgery, we reserve the right to cancel all future surgical procedures.    THE DAY OF SURGERY:    Arrival to the hospital or outpatient surgical center on time is imperative.  If you arrive late,  then your surgery will be cancelled.  You MUST have a family member/friend bring you, stay with you throughout the DURATION of your surgery, and drive you home.  You MUST be fasting the day of your surgery.  Therefore, do not consume any food or beverage after midnight the night before surgery.  At your pre-operative visit with the anesthesia staff, or during your phone screen, a nurse will instruct you what medications you will need to take the day of surgery.  MAKE SURE THAT THE PHARMACY LISTED IN THE ELECTRONIC MEDICAL RECORD (EPIC) IS YOUR PREFERRED PHARMACY. For example, if you are staying with family or a friend, and will not be near your preferred pharmacy, YOU MUST, tell the nurses checking you in the day of surgery so that this can be changed in the system.  If your prescriptions are sent to a pharmacy, this cannot be changed.      AFTER YOUR SURGERY:  Bleeding through the bandage almost always occurs.  Do not let this alarm you.  Simply add more gauze or a towel, call us, and come in for a dressing change.   If you think it is excessive, contact us immediately or go to the local emergency room.    Do not get the bandage wet.  Showering is possible with plastic protectors.   Be very careful, as the bathroom can be wet and slippery.  If you do get your dressing wet, it should be changed immediately.  Please contact us.      ONCE YOUR ARE OUT OF YOUR CAST AND/OR REMOVABLE BOOT, SWELLING MAY PERSIST FOR MANY MONTHS.  YOU MIGHT ALSO EXPERIENCE A BLUISH DISCOLORATION OF YOUR LEG.  THIS IS NORMAL AND PART OF THE USUAL POSTOPERATIVE EXPERIENCE.  WEARING COMPRESSION HOSE (ELASTIC STOCKINGS) CAN HELP AVOID SOME OF THIS SWELLING.      DRESSING:   The purpose of the surgical dressing is to keep your wound and the surgical site protected from the environment.  Most dressings contain splints, which help to hold your foot and ankle in a corrected position, and also allow the surgical site to heal properly. Dressings will  remain in place and undisturbed until the first postop visit.   If you have a drain in place, this will need to be removed in 1-3 days after surgery.  The time for the drain to be pulled will be written on your discharge instruction sheet.    CAST  INSTRUCTIONS:  You may or may not get a cast following surgery.  If you do, pay close attention to the following:    After application of a splint or cast, it is very important to elevate your leg for 24 to 72 hours.   The injured area should be elevated well above the heart.   Remember “Toes above your Nose”.  Rest and elevation greatly reduce pain and speed the healing process by minimizing early swelling.    CALL YOUR DOCTORS OFFICE OR VISIT LOCATION EMERGENCY ROOM IF YOU HAVE ANY OF THE FOLLOWING:    Significant increased pain, which may be caused by swelling, and the feeling that the splint or cast is too tight  Numbness and tingling in your hand or foot, which may be caused by too much pressure on the nerves  Burning and stinging, which may be caused by too much pressure on the skin  Excessive swelling below the cast, which may mean the cast is slowing your blood circulation  Loss of active movement of toes, which request an urgent evaluation  Loss of “capillary refill”.  Pinch the tip of toes and brian the skin.  Release pressure and if the skin does not return pink then call the office immediately.      DO NOT GET YOUR CAST WET.   Bacteria thrive in moist dark areas.  We do not want this.   If your cast becomes wet, return to the office and we will apply another one.    PAIN AFTER SURGERY:  Narcotic pain medication can and will depress your respiratory system if taken in excess.  The goal of pain management with narcotics is to be comfortable not pain free.  If you take enough narcotics to be pain free then you run the risk of stopping breathing.  If this happens, call 911 immediately!  Pain in the heel is often  caused by pressure from the weight of your foot on  the bed.  Make sure your heel is suspended off the bed by keeping a pillow underneath your calf not your knee.    Medications:  You will be given narcotic pain medication. Do NOT drive while taking narcotic medications. Medications such as Darvocet, Percocet, Vicoden or Tylenol #3, also contain acetaminophen (Tylenol). Do not take acetaminophen or Tylenol from home when taking theses medications. When you fill your prescription, you may ask the pharmacist if your pain medication has acetaminophen/Tylenol in it. It is okay to take Tylenol with Oxycontin/Oxycodone. Should you have pain after taking your prescription medication, ibuprophen (Motrin, Advil, and Alleve) is a common over the counter preparation and may often be taken with the prescription pain medication as long as you take them with food. These medications can irritate the stomach lining.   Unless you are allergic to aspirin or currently taking a blood thinner, Dr. Lachmans’ patients are requested to take one 325 mg aspirin every 12 hours until you are back to walking normally after surgery (This can be up to 6 weeks).  Narcotic medications commonly cause nausea. Taking them with food will decrease this side effect. If you are having extreme nausea, please contact us for an alternative medication or for something that can be taken with this medication to decrease the nausea.   Also, narcotic medications frequently cause constipation. An increase of fiber, fruits and vegetables in your diet may alleviate this problem, or if necessary, you may use an over-the-counter medication such as senekot, colace, or Fibercon for constipation problems.   You should resume all medications you were taking prior to the surgery unless otherwise specified.     Activity:   Because of your recent foot surgery, your activity level will decrease. You will need to elevate your foot ABOVE the level of your heart for a minimum of four days. The length of time necessary for the  swelling to go down, and for your wounds to heal properly depends greatly on your efforts here. Elevation is extremely important to avoid compromising the blood supply to your foot. Remember when your foot is down it will swell, which will increase pain and slow healing. Wiggle your toes frequently if possible.   If you go home with a regional block, (a type of anesthesia) the foot and leg will be numb. Think of ways to get into your house and around the house until the block wears off.   Keep in mind that it may be a legal issue if you drive while in a cast or splint, especially when the splint is on the right foot. You may call the Department of Motor Vehicles to schedule a road test if you have adaptive equipment applied to your car.   The amount of weight you are allowed to bear on your foot will be written on your discharge sheet filled out at the time of surgery. The following is an explanation of the possibilities:       COVID-19 Policies  [facility] has the following policies when it comes to ELECTIVE surgery  No elective surgery requiring anesthesia until 7 weeks after a patient tested positive for COVID-19   No elective surgery requiring anesthesia until 3 months after a patient was hospitalized for COVID-19      Non-weight bearing:   You are to put NO weight whatsoever on your foot. When using crutches or a walker, your foot should not touch the ground, except when you are standing. Then, it may rest on the ground. If you are to be non-weight bearing, and you are not compliant, you could compromise the surgery.     Some of our patients have been requesting prescriptions for a roll-a-bout knee scooter. [employer] and other insurances have been denying these claims, and you may either have to rent one or pay out of pocket to purchase one.  THIS SHOULD BE PURCHASED PRIOR TO THE SURGERY AND YOU SHOULD BRING IT WITH YOU THE DAY OF THE SURGERY TO AIDE IN GETTING FROM THE CAR INTO THE HOUSE AFTER  SURGERY.

## 2024-02-20 NOTE — PROGRESS NOTES
James R Lachman, M.D.  Attending, Orthopaedic Surgery  Foot and Ankle  [facility]      ORTHOPAEDIC FOOT AND ANKLE CLINIC VISIT     Assessment:     Encounter Diagnosis   Name Primary?    Posterior tibial tendon dysfunction (PTTD) of left lower extremity Yes            Plan:   The patient verbalized understanding of exam findings and treatment plan. We engaged in the shared decision-making process and treatment options were discussed at length with the patient. Surgical and conservative management discussed today along with risks and benefits.  Patient has a stage 2b PTTD of the left lower extremity with a flexible deformity. The pathoanatomy and natural history of this diagnosis was explained to the patient in detail today in the office.   She has tried bracing, orthotics and PT/HEP without benefit. She is wishing to perform a surgical procedure due to her continued pain.  The surgical procedure of a 5 in 1 flatfoot correction would be recommended. MCO, Cotton osteotomy, Achiles lengthening, FDL tendon transfer, possible Camejo osteotomy  Return for 3 week post operative visit.    CONSENT FOR BONY PROCEDURES:   Patient understands that there is no guarantee that the surgery will relieve all of Her pain and also understands that there may be a prolonged course of protected weight-bearing status required which will restrict them from driving and other activities as discussed at today's visit. Patient recognizes that there are risks with surgery including bleeding, numbness, nerve irritation, wound complications, infection, continued pain, joint stiffness, malunion, nonunion, anesthetic complications, death, failure of procedure and possible need for further surgery. The patient understands that there is no guarantee that this surgery will relieve all of Her pain and symptoms.  Patient understands that there is no guarantee that they will return to full function after the procedure. Patient  has provided informed consent for the procedure.      History of Present Illness:   Chief Complaint:   Chief Complaint   Patient presents with    Left Foot - Pain     Supposed to surgery Thursday with [facility]. In a boot walking on it. Wants to talk about surgery.      Treasure Chung is a 58 y.o. female who is being seen for left foot/ankle pain.  Pain is localized at medial ankle and foot with minimal radiating and described as sharp and severe. Patient reports a tingling sensation about the medial foot but denies numbness or radicular pain.  Denies history of neuropathy.  Patient does not smoke, does not have diabetes and does not take blood thinners.  Patient denies family history of anesthesia complications and has not had any complications with anesthesia.     Pain/symptom timing:  Worse during the day when active  Pain/symptom context:  Worse with activites and work  Pain/symptom modifying factors:  Rest makes better, activities make worse  Pain/symptom associated signs/symptoms: none    Prior treatment   NSAIDsYes   Injections No   Bracing/Orthotics Yes    Physical Therapy Yes     Orthopedic Surgical History:   See below    Past Medical, Surgical and Social History:  Past Medical History:  has a past medical history of Hyperlipidemia and Hypertension.  Problem List: does not have any pertinent problems on file.  Past Surgical History:  has a past surgical history that includes Tubal ligation; Hysterectomy; Hand surgery; [place] tooth extraction; Blepharoptosis repair; Wrist surgery; MOHS RECONSTRUCTION (N/A, 02/24/2021); and Excision basal cell carcinoma (Left).  Family History: family history includes Basal cell carcinoma in her father; Breast cancer in her cousin; Cancer in her brother; Diabetes in her brother, father, maternal grandfather, mother, paternal grandfather, paternal grandmother, and sister; Heart disease in her brother, maternal grandmother, and other; Heart failure in her mother; Hyperlipidemia in  "her brother, brother, father, mother, sister, and sister; Hypertension in her brother, brother, father, mother, and sister; Kidney failure in her sister; Leukemia in her other; Ovarian cancer in her paternal grandmother; Stroke in her father, maternal aunt, and maternal grandmother.  Social History:  reports that she quit smoking about 22 years ago. Her smoking use included cigarettes. She has never used smokeless tobacco. She reports current alcohol use of about 1.0 - 2.0 standard drink of alcohol per week. She reports that she does not use drugs.  Current Medications: has a current medication list which includes the following prescription(s): bupropion, cholecalciferol, lisinopril-hydrochlorothiazide, trazodone, pantoprazole, and rosuvastatin.  Allergies: is allergic to ciprofloxacin and medical tape.     Review of Systems:  General- denies fever/chills  HEENT- denies hearing loss or sore throat  Eyes- denies eye pain or visual disturbances, denies red eyes  Respiratory- denies cough or SOB  Cardio- denies chest pain or palpitations  GI- denies abdominal pain  Endocrine- denies urinary frequency  Urinary- denies pain with urination  Musculoskeletal- Negative except noted above  Skin- denies rashes or wounds  Neurological- denies dizziness or headache  Psychiatric- denies anxiety or difficulty concentrating    Physical Exam:   /71   Pulse 84   Ht 5' 4\" (1.626 m)   Wt 92.1 kg (203 lb)   BMI 34.84 kg/m²   General/Constitutional: No apparent distress: well-nourished and well developed.  Eyes: normal ocular motion  Cardio: RRR, Normal S1S2, No m/r/g  Lymphatic: No appreciable lymphadenopathy  Respiratory: Non-labored breathing, CTA b/l no w/c/r  Vascular: No edema, swelling or tenderness, except as noted in detailed exam.  Integumentary: No impressive skin lesions present, except as noted in detailed exam.  Neuro: No ataxia or tremors noted  Psych: Normal mood and affect, oriented to person, place and time. " Appropriate affect.  Musculoskeletal: Normal, except as noted in detailed exam and in HPI.    Examination    Left    Gait Antalgic in CAM boot  Flexible flatfoot deformity   Musculoskeletal Tender to palpation at posterior tibial tendon    Skin Normal.      Nails Normal    Range of Motion  20 degrees dorsiflexion, 30 degrees plantarflexion  Subtalar motion: normal    Stability Stable    Muscle Strength 5/5 tibialis anterior  5/5 gastrocnemius-soleus  5/5 posterior tibialis  5/5 peroneal/eversion strength  5/5 EHL  5/5 FHL    Neurologic Normal    Sensation Intact to light touch throughout sural, saphenous, superficial peroneal, deep peroneal and medial/lateral plantar nerve distributions.  [place]-Loren 5.07 filament (10g) testing deferred.    Cardiovascular Brisk capillary refill < 2 seconds,intact DP and PT pulses    Special Tests None      Imaging Studies:   5 views of the left foot/ankle were taken, reviewed and interpreted independently that demonstrate Meary's angle of 12 degrees. No other acute osseous abnormalities or degenerative changes. Reviewed by me personally.        James R. Lachman, MD  Foot & Ankle Surgery   Department of Orthopaedic Surgery  [facility]      I personally performed the service.    James R. Lachman, MD    Scribe Attestation      I,:  Sandor Carter am acting as a scribe while in the presence of the attending physician.:       I,:  James R Lachman, MD personally performed the services described in this documentation    as scribed in my presence.:

## 2024-02-20 NOTE — H&P (VIEW-ONLY)
James R Lachman, M.D.  Attending, Orthopaedic Surgery  Foot and Ankle  [facility]      ORTHOPAEDIC FOOT AND ANKLE CLINIC VISIT     Assessment:     Encounter Diagnosis   Name Primary?    Posterior tibial tendon dysfunction (PTTD) of left lower extremity Yes            Plan:   The patient verbalized understanding of exam findings and treatment plan. We engaged in the shared decision-making process and treatment options were discussed at length with the patient. Surgical and conservative management discussed today along with risks and benefits.  Patient has a stage 2b PTTD of the left lower extremity with a flexible deformity. The pathoanatomy and natural history of this diagnosis was explained to the patient in detail today in the office.   She has tried bracing, orthotics and PT/HEP without benefit. She is wishing to perform a surgical procedure due to her continued pain.  The surgical procedure of a 5 in 1 flatfoot correction would be recommended. MCO, Cotton osteotomy, Achiles lengthening, FDL tendon transfer, possible Camejo osteotomy  Return for 3 week post operative visit.    CONSENT FOR BONY PROCEDURES:   Patient understands that there is no guarantee that the surgery will relieve all of Her pain and also understands that there may be a prolonged course of protected weight-bearing status required which will restrict them from driving and other activities as discussed at today's visit. Patient recognizes that there are risks with surgery including bleeding, numbness, nerve irritation, wound complications, infection, continued pain, joint stiffness, malunion, nonunion, anesthetic complications, death, failure of procedure and possible need for further surgery. The patient understands that there is no guarantee that this surgery will relieve all of Her pain and symptoms.  Patient understands that there is no guarantee that they will return to full function after the procedure. Patient  has provided informed consent for the procedure.      History of Present Illness:   Chief Complaint:   Chief Complaint   Patient presents with    Left Foot - Pain     Supposed to surgery Thursday with [facility]. In a boot walking on it. Wants to talk about surgery.      Treasure Chung is a 58 y.o. female who is being seen for left foot/ankle pain.  Pain is localized at medial ankle and foot with minimal radiating and described as sharp and severe. Patient reports a tingling sensation about the medial foot but denies numbness or radicular pain.  Denies history of neuropathy.  Patient does not smoke, does not have diabetes and does not take blood thinners.  Patient denies family history of anesthesia complications and has not had any complications with anesthesia.     Pain/symptom timing:  Worse during the day when active  Pain/symptom context:  Worse with activites and work  Pain/symptom modifying factors:  Rest makes better, activities make worse  Pain/symptom associated signs/symptoms: none    Prior treatment   NSAIDsYes   Injections No   Bracing/Orthotics Yes    Physical Therapy Yes     Orthopedic Surgical History:   See below    Past Medical, Surgical and Social History:  Past Medical History:  has a past medical history of Hyperlipidemia and Hypertension.  Problem List: does not have any pertinent problems on file.  Past Surgical History:  has a past surgical history that includes Tubal ligation; Hysterectomy; Hand surgery; [place] tooth extraction; Blepharoptosis repair; Wrist surgery; MOHS RECONSTRUCTION (N/A, 02/24/2021); and Excision basal cell carcinoma (Left).  Family History: family history includes Basal cell carcinoma in her father; Breast cancer in her cousin; Cancer in her brother; Diabetes in her brother, father, maternal grandfather, mother, paternal grandfather, paternal grandmother, and sister; Heart disease in her brother, maternal grandmother, and other; Heart failure in her mother; Hyperlipidemia in  "her brother, brother, father, mother, sister, and sister; Hypertension in her brother, brother, father, mother, and sister; Kidney failure in her sister; Leukemia in her other; Ovarian cancer in her paternal grandmother; Stroke in her father, maternal aunt, and maternal grandmother.  Social History:  reports that she quit smoking about 22 years ago. Her smoking use included cigarettes. She has never used smokeless tobacco. She reports current alcohol use of about 1.0 - 2.0 standard drink of alcohol per week. She reports that she does not use drugs.  Current Medications: has a current medication list which includes the following prescription(s): bupropion, cholecalciferol, lisinopril-hydrochlorothiazide, trazodone, pantoprazole, and rosuvastatin.  Allergies: is allergic to ciprofloxacin and medical tape.     Review of Systems:  General- denies fever/chills  HEENT- denies hearing loss or sore throat  Eyes- denies eye pain or visual disturbances, denies red eyes  Respiratory- denies cough or SOB  Cardio- denies chest pain or palpitations  GI- denies abdominal pain  Endocrine- denies urinary frequency  Urinary- denies pain with urination  Musculoskeletal- Negative except noted above  Skin- denies rashes or wounds  Neurological- denies dizziness or headache  Psychiatric- denies anxiety or difficulty concentrating    Physical Exam:   /71   Pulse 84   Ht 5' 4\" (1.626 m)   Wt 92.1 kg (203 lb)   BMI 34.84 kg/m²   General/Constitutional: No apparent distress: well-nourished and well developed.  Eyes: normal ocular motion  Cardio: RRR, Normal S1S2, No m/r/g  Lymphatic: No appreciable lymphadenopathy  Respiratory: Non-labored breathing, CTA b/l no w/c/r  Vascular: No edema, swelling or tenderness, except as noted in detailed exam.  Integumentary: No impressive skin lesions present, except as noted in detailed exam.  Neuro: No ataxia or tremors noted  Psych: Normal mood and affect, oriented to person, place and time. " Appropriate affect.  Musculoskeletal: Normal, except as noted in detailed exam and in HPI.    Examination    Left    Gait Antalgic in CAM boot  Flexible flatfoot deformity   Musculoskeletal Tender to palpation at posterior tibial tendon    Skin Normal.      Nails Normal    Range of Motion  20 degrees dorsiflexion, 30 degrees plantarflexion  Subtalar motion: normal    Stability Stable    Muscle Strength 5/5 tibialis anterior  5/5 gastrocnemius-soleus  5/5 posterior tibialis  5/5 peroneal/eversion strength  5/5 EHL  5/5 FHL    Neurologic Normal    Sensation Intact to light touch throughout sural, saphenous, superficial peroneal, deep peroneal and medial/lateral plantar nerve distributions.  [place]-Loren 5.07 filament (10g) testing deferred.    Cardiovascular Brisk capillary refill < 2 seconds,intact DP and PT pulses    Special Tests None      Imaging Studies:   5 views of the left foot/ankle were taken, reviewed and interpreted independently that demonstrate Meary's angle of 12 degrees. No other acute osseous abnormalities or degenerative changes. Reviewed by me personally.        James R. Lachman, MD  Foot & Ankle Surgery   Department of Orthopaedic Surgery  [facility]      I personally performed the service.    James R. Lachman, MD    Scribe Attestation      I,:  Sandor Carter am acting as a scribe while in the presence of the attending physician.:       I,:  James R Lachman, MD personally performed the services described in this documentation    as scribed in my presence.:

## 2024-02-21 RX ORDER — IBUPROFEN 200 MG
1 CAPSULE ORAL DAILY
COMMUNITY

## 2024-02-21 NOTE — PRE-PROCEDURE INSTRUCTIONS
Pre-Surgery Instructions:   Medication Instructions    buPROPion (WELLBUTRIN XL) 300 mg 24 hr tablet Take day of surgery.    calcium carbonate (OS-MARLON) 1250 (500 Ca) MG tablet Hold day of surgery.    cholecalciferol (VITAMIN D3) 1,000 units tablet Hold day of surgery.    lisinopril-hydrochlorothiazide (PRINZIDE,ZESTORETIC) 10-12.5 MG per tablet Hold day of surgery.    traZODone (DESYREL) 150 mg tablet Take night before surgery   Pt has scooter and crutches     Medication instructions for day surgery reviewed. Please use only a sip of water to take your instructed medications. Avoid all over the counter vitamins, supplements and NSAIDS for one week prior to surgery per anesthesia guidelines. Tylenol is ok to take as needed.     You will receive a call one business day prior to surgery with an arrival time and hospital directions. If your surgery is scheduled on a Monday, the hospital will be calling you on the Friday prior to your surgery. If you have not heard from anyone by 8pm, please call the hospital supervisor through the hospital  at [phone]. ([place] [phone] or [place] [phone]).    Do not eat or drink anything after midnight the night before your surgery, including candy, mints, lifesavers, or chewing gum. Do not drink alcohol 24hrs before your surgery. Try not to smoke at least 24hrs before your surgery.       Follow the pre surgery showering instructions as listed in the “My Surgical Experience Booklet” or otherwise provided by your surgeon's office. Do not use a blade to shave the surgical area 1 week before surgery. It is okay to use a clean electric clippers up to 24 hours before surgery. Do not apply any lotions, creams, including makeup, cologne, deodorant, or perfumes after showering on the day of your surgery. Do not use dry shampoo, hair spray, hair gel, or any type of hair products.     No contact lenses, eye make-up, or artificial eyelashes. Remove nail polish, including  gel polish, and any artificial, gel, or acrylic nails if possible. Remove all jewelry including rings and body piercing jewelry.     Wear causal clothing that is easy to take on and off. Consider your type of surgery.    Keep any valuables, jewelry, piercings at home. Please bring any specially ordered equipment (sling, braces) if indicated.    Arrange for a responsible person to drive you to and from the hospital on the day of your surgery. Please confirm the visitor policy for the day of your procedure when you receive your phone call with an arrival time.     Call the surgeon's office with any new illnesses, exposures, or additional questions prior to surgery.    Please reference your “My Surgical Experience Booklet” for additional information to prepare for your upcoming surgery.

## 2024-02-25 ENCOUNTER — ANESTHESIA EVENT (OUTPATIENT)
Dept: PERIOP | Facility: HOSPITAL | Age: 59
End: 2024-02-25
Payer: COMMERCIAL

## 2024-02-25 PROBLEM — E66.9 OBESITY (BMI 30.0-34.9): Status: ACTIVE | Noted: 2024-02-25

## 2024-02-25 PROBLEM — Z98.890 PONV (POSTOPERATIVE NAUSEA AND VOMITING): Status: ACTIVE | Noted: 2024-02-25

## 2024-02-25 PROBLEM — R11.2 PONV (POSTOPERATIVE NAUSEA AND VOMITING): Status: ACTIVE | Noted: 2024-02-25

## 2024-02-25 PROBLEM — E66.811 OBESITY (BMI 30.0-34.9): Status: ACTIVE | Noted: 2024-02-25

## 2024-02-26 ENCOUNTER — HOSPITAL ENCOUNTER (OUTPATIENT)
Facility: HOSPITAL | Age: 59
Setting detail: OUTPATIENT SURGERY
Discharge: HOME | End: 2024-02-26
Attending: ORTHOPAEDIC SURGERY | Admitting: ORTHOPAEDIC SURGERY
Payer: COMMERCIAL

## 2024-02-26 ENCOUNTER — ANESTHESIA (OUTPATIENT)
Dept: PERIOP | Facility: HOSPITAL | Age: 59
End: 2024-02-26
Payer: COMMERCIAL

## 2024-02-26 ENCOUNTER — APPOINTMENT (OUTPATIENT)
Dept: RADIOLOGY | Facility: HOSPITAL | Age: 59
End: 2024-02-26
Payer: COMMERCIAL

## 2024-02-26 VITALS
WEIGHT: 206 LBS | HEART RATE: 64 BPM | TEMPERATURE: 98.7 F | HEIGHT: 64 IN | RESPIRATION RATE: 16 BRPM | SYSTOLIC BLOOD PRESSURE: 108 MMHG | OXYGEN SATURATION: 93 % | DIASTOLIC BLOOD PRESSURE: 64 MMHG | BODY MASS INDEX: 35.17 KG/M2

## 2024-02-26 DIAGNOSIS — M76.822 POSTERIOR TIBIAL TENDON DYSFUNCTION (PTTD) OF LEFT LOWER EXTREMITY: Primary | ICD-10-CM

## 2024-02-26 PROCEDURE — C1769 GUIDE WIRE: HCPCS | Performed by: ORTHOPAEDIC SURGERY

## 2024-02-26 PROCEDURE — 27687 REVISION OF CALF TENDON: CPT

## 2024-02-26 PROCEDURE — 28300 INCISION OF HEEL BONE: CPT | Performed by: ORTHOPAEDIC SURGERY

## 2024-02-26 PROCEDURE — 28300 INCISION OF HEEL BONE: CPT

## 2024-02-26 PROCEDURE — 73650 X-RAY EXAM OF HEEL: CPT

## 2024-02-26 PROCEDURE — C1713 ANCHOR/SCREW BN/BN,TIS/BN: HCPCS | Performed by: ORTHOPAEDIC SURGERY

## 2024-02-26 PROCEDURE — 27687 REVISION OF CALF TENDON: CPT | Performed by: ORTHOPAEDIC SURGERY

## 2024-02-26 PROCEDURE — 27695 REPAIR OF ANKLE LIGAMENT: CPT | Performed by: ORTHOPAEDIC SURGERY

## 2024-02-26 PROCEDURE — 27695 REPAIR OF ANKLE LIGAMENT: CPT

## 2024-02-26 PROCEDURE — 27691 REVISE LOWER LEG TENDON: CPT

## 2024-02-26 PROCEDURE — 28304 INCISION OF MIDFOOT BONES: CPT

## 2024-02-26 PROCEDURE — C9290 INJ, BUPIVACAINE LIPOSOME: HCPCS | Performed by: ANESTHESIOLOGY

## 2024-02-26 PROCEDURE — 27691 REVISE LOWER LEG TENDON: CPT | Performed by: ORTHOPAEDIC SURGERY

## 2024-02-26 PROCEDURE — 28304 INCISION OF MIDFOOT BONES: CPT | Performed by: ORTHOPAEDIC SURGERY

## 2024-02-26 DEVICE — HEADLESS COMPRESSION SCREW
Type: IMPLANTABLE DEVICE | Site: HEEL | Status: FUNCTIONAL
Brand: FIXOS

## 2024-02-26 DEVICE — IMPLANTABLE DEVICE
Type: IMPLANTABLE DEVICE | Site: FOOT | Status: FUNCTIONAL
Brand: G-FORCE

## 2024-02-26 DEVICE — IMPLANTABLE DEVICE
Type: IMPLANTABLE DEVICE | Site: FOOT | Status: FUNCTIONAL
Brand: BIOFOAM

## 2024-02-26 RX ORDER — FENTANYL CITRATE/PF 50 MCG/ML
25 SYRINGE (ML) INJECTION
Status: DISCONTINUED | OUTPATIENT
Start: 2024-02-26 | End: 2024-02-26 | Stop reason: HOSPADM

## 2024-02-26 RX ORDER — CHLORHEXIDINE GLUCONATE ORAL RINSE 1.2 MG/ML
15 SOLUTION DENTAL ONCE
Status: DISCONTINUED | OUTPATIENT
Start: 2024-02-26 | End: 2024-02-26 | Stop reason: HOSPADM

## 2024-02-26 RX ORDER — OXYCODONE HYDROCHLORIDE 5 MG/1
5 TABLET ORAL EVERY 4 HOURS PRN
Qty: 30 TABLET | Refills: 0 | Status: SHIPPED | OUTPATIENT
Start: 2024-02-26

## 2024-02-26 RX ORDER — PROPOFOL 10 MG/ML
INJECTION, EMULSION INTRAVENOUS AS NEEDED
Status: DISCONTINUED | OUTPATIENT
Start: 2024-02-26 | End: 2024-02-26

## 2024-02-26 RX ORDER — HYDROMORPHONE HCL/PF 1 MG/ML
0.5 SYRINGE (ML) INJECTION
Status: DISCONTINUED | OUTPATIENT
Start: 2024-02-26 | End: 2024-02-26 | Stop reason: HOSPADM

## 2024-02-26 RX ORDER — ASPIRIN 325 MG
325 TABLET, DELAYED RELEASE (ENTERIC COATED) ORAL 2 TIMES DAILY
Qty: 84 TABLET | Refills: 0 | Status: SHIPPED | OUTPATIENT
Start: 2024-02-26

## 2024-02-26 RX ORDER — BUPIVACAINE HYDROCHLORIDE 2.5 MG/ML
INJECTION, SOLUTION EPIDURAL; INFILTRATION; INTRACAUDAL
Status: COMPLETED | OUTPATIENT
Start: 2024-02-26 | End: 2024-02-26

## 2024-02-26 RX ORDER — SCOLOPAMINE TRANSDERMAL SYSTEM 1 MG/1
1 PATCH, EXTENDED RELEASE TRANSDERMAL
Status: DISCONTINUED | OUTPATIENT
Start: 2024-02-26 | End: 2024-02-26 | Stop reason: HOSPADM

## 2024-02-26 RX ORDER — CEFAZOLIN SODIUM 2 G/50ML
2000 SOLUTION INTRAVENOUS ONCE
Status: DISCONTINUED | OUTPATIENT
Start: 2024-02-26 | End: 2024-02-26 | Stop reason: HOSPADM

## 2024-02-26 RX ORDER — OXYCODONE HYDROCHLORIDE 5 MG/1
5 TABLET ORAL ONCE AS NEEDED
Status: DISCONTINUED | OUTPATIENT
Start: 2024-02-26 | End: 2024-02-26 | Stop reason: HOSPADM

## 2024-02-26 RX ORDER — MIDAZOLAM HYDROCHLORIDE 2 MG/2ML
INJECTION, SOLUTION INTRAMUSCULAR; INTRAVENOUS
Status: COMPLETED | OUTPATIENT
Start: 2024-02-26 | End: 2024-02-26

## 2024-02-26 RX ORDER — FENTANYL CITRATE 50 UG/ML
INJECTION, SOLUTION INTRAMUSCULAR; INTRAVENOUS
Status: COMPLETED | OUTPATIENT
Start: 2024-02-26 | End: 2024-02-26

## 2024-02-26 RX ORDER — VANCOMYCIN HYDROCHLORIDE 1 G/20ML
INJECTION, POWDER, LYOPHILIZED, FOR SOLUTION INTRAVENOUS AS NEEDED
Status: DISCONTINUED | OUTPATIENT
Start: 2024-02-26 | End: 2024-02-26 | Stop reason: HOSPADM

## 2024-02-26 RX ORDER — DEXAMETHASONE SODIUM PHOSPHATE 10 MG/ML
INJECTION, SOLUTION INTRAMUSCULAR; INTRAVENOUS AS NEEDED
Status: DISCONTINUED | OUTPATIENT
Start: 2024-02-26 | End: 2024-02-26

## 2024-02-26 RX ORDER — ONDANSETRON 2 MG/ML
INJECTION INTRAMUSCULAR; INTRAVENOUS AS NEEDED
Status: DISCONTINUED | OUTPATIENT
Start: 2024-02-26 | End: 2024-02-26

## 2024-02-26 RX ORDER — SODIUM CHLORIDE, SODIUM LACTATE, POTASSIUM CHLORIDE, CALCIUM CHLORIDE 600; 310; 30; 20 MG/100ML; MG/100ML; MG/100ML; MG/100ML
INJECTION, SOLUTION INTRAVENOUS CONTINUOUS PRN
Status: DISCONTINUED | OUTPATIENT
Start: 2024-02-26 | End: 2024-02-26

## 2024-02-26 RX ORDER — ONDANSETRON 4 MG/1
4 TABLET, FILM COATED ORAL EVERY 8 HOURS PRN
Qty: 10 TABLET | Refills: 0 | Status: SHIPPED | OUTPATIENT
Start: 2024-02-26

## 2024-02-26 RX ORDER — MAGNESIUM HYDROXIDE 1200 MG/15ML
LIQUID ORAL AS NEEDED
Status: DISCONTINUED | OUTPATIENT
Start: 2024-02-26 | End: 2024-02-26 | Stop reason: HOSPADM

## 2024-02-26 RX ORDER — GLYCOPYRROLATE 0.2 MG/ML
INJECTION INTRAMUSCULAR; INTRAVENOUS AS NEEDED
Status: DISCONTINUED | OUTPATIENT
Start: 2024-02-26 | End: 2024-02-26

## 2024-02-26 RX ORDER — ONDANSETRON 2 MG/ML
4 INJECTION INTRAMUSCULAR; INTRAVENOUS ONCE
Status: DISCONTINUED | OUTPATIENT
Start: 2024-02-26 | End: 2024-02-26 | Stop reason: HOSPADM

## 2024-02-26 RX ORDER — CHLORHEXIDINE GLUCONATE 4 G/100ML
SOLUTION TOPICAL DAILY PRN
Status: DISCONTINUED | OUTPATIENT
Start: 2024-02-26 | End: 2024-02-26 | Stop reason: HOSPADM

## 2024-02-26 RX ORDER — LIDOCAINE HYDROCHLORIDE 10 MG/ML
INJECTION, SOLUTION EPIDURAL; INFILTRATION; INTRACAUDAL; PERINEURAL AS NEEDED
Status: DISCONTINUED | OUTPATIENT
Start: 2024-02-26 | End: 2024-02-26

## 2024-02-26 RX ADMIN — FENTANYL CITRATE 50 MCG: 50 INJECTION, SOLUTION INTRAMUSCULAR; INTRAVENOUS at 09:55

## 2024-02-26 RX ADMIN — FENTANYL CITRATE 25 MCG: 50 INJECTION INTRAMUSCULAR; INTRAVENOUS at 11:30

## 2024-02-26 RX ADMIN — MIDAZOLAM 2 MG: 1 INJECTION INTRAMUSCULAR; INTRAVENOUS at 09:30

## 2024-02-26 RX ADMIN — PROPOFOL 200 MG: 10 INJECTION, EMULSION INTRAVENOUS at 09:37

## 2024-02-26 RX ADMIN — GLYCOPYRROLATE 0.1 MG: 0.2 INJECTION INTRAMUSCULAR; INTRAVENOUS at 09:45

## 2024-02-26 RX ADMIN — PHENYLEPHRINE HYDROCHLORIDE 20 MCG/MIN: 10 INJECTION, SOLUTION INTRAVENOUS at 09:45

## 2024-02-26 RX ADMIN — FENTANYL CITRATE 100 MCG: 50 INJECTION, SOLUTION INTRAMUSCULAR; INTRAVENOUS at 09:30

## 2024-02-26 RX ADMIN — ONDANSETRON 4 MG: 2 INJECTION INTRAMUSCULAR; INTRAVENOUS at 09:45

## 2024-02-26 RX ADMIN — SODIUM CHLORIDE, SODIUM LACTATE, POTASSIUM CHLORIDE, AND CALCIUM CHLORIDE: .6; .31; .03; .02 INJECTION, SOLUTION INTRAVENOUS at 09:35

## 2024-02-26 RX ADMIN — DEXAMETHASONE SODIUM PHOSPHATE 10 MG: 10 INJECTION, SOLUTION INTRAMUSCULAR; INTRAVENOUS at 09:45

## 2024-02-26 RX ADMIN — BUPIVACAINE HYDROCHLORIDE 10 ML: 2.5 INJECTION, SOLUTION EPIDURAL; INFILTRATION; INTRACAUDAL; PERINEURAL at 09:29

## 2024-02-26 RX ADMIN — SCOPALAMINE 1 PATCH: 1 PATCH, EXTENDED RELEASE TRANSDERMAL at 08:19

## 2024-02-26 RX ADMIN — BUPIVACAINE 20 ML: 13.3 INJECTION, SUSPENSION, LIPOSOMAL INFILTRATION at 09:30

## 2024-02-26 RX ADMIN — FENTANYL CITRATE 50 MCG: 50 INJECTION, SOLUTION INTRAMUSCULAR; INTRAVENOUS at 10:04

## 2024-02-26 RX ADMIN — FENTANYL CITRATE 25 MCG: 50 INJECTION INTRAMUSCULAR; INTRAVENOUS at 11:24

## 2024-02-26 RX ADMIN — LIDOCAINE HYDROCHLORIDE 100 MG: 10 INJECTION, SOLUTION EPIDURAL; INFILTRATION; INTRACAUDAL; PERINEURAL at 09:37

## 2024-02-26 NOTE — DISCHARGE INSTR - AVS FIRST PAGE
James R Lachman, M.D.  Attending, Orthopaedic Surgery  [facility]  [place] Office Phone: [phone] ? Fax: [phone]  [place] Office Phone: [phone] ? Fax:[phone]    : Rigoberto Haider MA     Surgery Coordinators [place]: Janice Leblanc, [phone]  Flores Fernández, [phone]  Surgery Coordinator [place]:  Poppy Altamirano, [phone]                                                        Jolanta Reynoso, [phone]                                                              [email or web address]   POST-OPERATIVE INSTRUCTIONS    General Information:  Typical post operative visits are at the following intervals:  2-3 weeks post surgery, 6 weeks post surgery, 3 months post surgery, 6 months post surgery, and then on a yearly basis.  However, this may change based on Dr. Lachmans’ recommendation.  #1 post-operative rule for foot/ankle surgery:  ONCE YOU ARE OUT OF YOUR CAST AND/OR REMOVABLE BOOT, SWELLING MAY PERSIST FOR MANY MONTHS.  YOU MIGHT ALSO EXPERIENCE A BLUISH DISCOLORATION OF YOUR LEG.  THIS IS NORMAL AND PART OF THE USUAL POSTOPERATIVE EXPERIENCE.  DO NOT WAIT UNTIL YOUR BLOCK WEARS OFF TO TAKE YOUR PAIN MEDICATION.  IT TAKES A FEW DOSES OF THE PAIN MEDICATION TO REACH A THERAPEUTIC LEVEL.  TAKE A TABLET PROACTIVELY BEFORE YOU HAVE ANY PAIN AND AGAIN 4 HOURS LATER SO WHEN THE BLOCK WEARS OFF, YOU ARE NOT CAUGHT OFF GUARD.    SMOKING:  Smoking results in incomplete healing of fractures (broken bones) and joints that my have been fused.  Smoking and nicotine also prevents the growth of bone into ankle replacements and bone healing.  It also slows the healing of muscles and skin (soft tissue).  Therefore, please do not have surgery if you continue to smoke.  We reserve the right to cancel your surgery if we suspect that you are smoking.  DO NOT use nicorette gum or other patches.  Please find an alternative  method to quit smoking before your surgery and do not restart after surgery to allow for healing.      THREE RULES:    After surgery you will most likely be given the instructions “KEEP YOUR TOES ABOVE YOUR NOSE.”  This means that you MUST have your feet elevated higher than your heart.  Keeping your toes above your nose helps to heal the muscles and skin (soft tissues) by reducing swelling in your leg.  This position also helps to prevent infection, and is very important in avoiding deep venous thrombosis (blood clots).    In order to keep the blood circulating in your legs and in order to avoid deep vein   thrombosis (blood clots), we ask patients to GET UP ONCE AN HOUR during the day.  This means you should at least cross the room and come back.  It does not mean you have to be up for long periods of time.  In most cases we will not have people immediately put any weight on their operated part.  This is important to prevent loosening of metal or other devices holding the bones together.  It also prevents irritation of the soft tissues which can lead to prolonged healing.  When we say get up once an hour, please walk, hop or move with an assisted device.  This is important!    Do not do any excessive walking during the first few days after surgery.  Recovering from surgery is a full-time task for the patient.  Postoperative care is important to avoid irritating the skin incision, which can lead to infection.  Please do not plan activities or go out of town for several weeks after surgery.        AFTER YOUR SURGERY:  Bleeding through the bandage almost always occurs.  Do not let this alarm you.  Simply overwrap with an ABD pad and Ace bandage (The nurses discharging your from the day of surgery will provide this.)   If you think it is excessive, you can come in early for a dressing change (at around 1 week instead of 3 weeks postop.)    Do not get the bandage wet.  Showering is possible with plastic protectors.    Be very careful, as the bathroom can be wet and slippery.  If you do get your dressing wet, it should be changed immediately.  Please contact us.      ONCE YOUR ARE OUT OF YOUR CAST AND/OR REMOVABLE BOOT, SWELLING MAY PERSIST FOR MANY MONTHS.  THERE WILL ALSO BE A BLUISH DISCOLORATION OF YOUR LEG FOR MONTHS.  THIS IS NORMAL AND PART OF THE USUAL POSTOPERATIVE EXPERIENCE.  WEARING COMPRESSION HOSE (ELASTIC STOCKINGS) CAN HELP AVOID SOME OF THIS SWELLING.    Ice the area 20 minutes every hour once the nerve block wears off. If you are in a cast or a splint, you may need to leave the ice on longer than 20 minutes in order to feel any benefits.       DRESSING:   The purpose of the surgical dressing is to keep your wound and the surgical site protected from the environment.  Most dressings contain splints, which help to hold your foot and ankle in a corrected position, and also allow the surgical site to heal properly.   If you have a drain in place, this will need to be removed in 1 day after surgery.  The time for the drain to be pulled will be written on your discharge instruction sheet.    CAST  INSTRUCTIONS:  You may or may not get a cast following surgery.  If you do, pay close attention to the following:    After application of a splint or cast, it is very important to elevate your leg for 24 to 72 hours.   The injured area should be elevated well above the heart.   Remember “Toes above your Nose”.  Rest and elevation greatly reduce pain and speed the healing process by minimizing early swelling.    CALL YOUR DOCTORS OFFICE OR VISIT LOCATION EMERGENCY ROOM IF YOU HAVE ANY OF THE FOLLOWING:    Significant increased pain, which may be caused by swelling (Strict elevation will alleviate this)  Numbness and tingling in your hand or foot, which may be caused by too much pressure on the nerves (There is always some numbness after surgery due to nerve blocks)  Burning and stinging, which may be caused by too much  pressure on the skin  Excessive swelling below the cast, which may mean the cast is slowing your blood circulation  Loss of active movement of toes, which request an urgent evaluation  Loss of “capillary refill”.  Pinch the tip of toes and brian the skin.  Release pressure and if the skin does not return pink then call the office immediately.      DO NOT GET YOUR CAST WET.   Bacteria thrive in moist dark areas.  We do not want this.   If your cast becomes wet, return to the office and we will apply another one.    PAIN AFTER SURGERY:  Narcotic pain medication can and will depress your respiratory system if taken in excess.  The goal of pain management with narcotics is to be comfortable not pain free.  If you take enough narcotics to be pain free then you run the risk of stopping breathing.  If this happens, call 911 immediately!  Pain in the heel is often  caused by pressure from the weight of your foot on the bed.  Make sure your heel is suspended off the bed by keeping a pillow underneath your calf not your knee.    Medications:  You will be given narcotic pain medication. Do NOT drive while taking narcotic medications. Medications such as Darvocet, Percocet, Vicoden or Tylenol #3, also contain acetaminophen (Tylenol). Do not take acetaminophen or Tylenol from home when taking theses medications. When you fill your prescription, you may ask the pharmacist if your pain medication has acetaminophen/Tylenol in it. It is okay to take Tylenol with Oxycontin/Oxycodone.   Unless you are allergic to aspirin or currently taking a blood thinner, Dr. Lachmans’ patients are requested to take one 325 mg aspirin every 12 hours until you are back to walking normally after surgery (This can be up to 6 weeks). Ecotrin (Enteric-coated aspirin) is more sensitive to the stomach and we recommend purchasing this instead of regular aspirin to minimize the risk of stomach irritation.  Narcotic medications commonly cause nausea. Taking  them with food will decrease this side effect. If you are having extreme nausea, please contact us for an alternative medication or for something that can be taken with this medication to decrease the nausea.   Also, narcotic medications frequently cause constipation. An increase of fiber, fruits and vegetables in your diet may alleviate this problem, or if necessary, you may use an over-the-counter medication such as senekot, colace, or Fibercon for constipation problems.   You should resume all medications you were taking prior to the surgery unless otherwise specified.   If you had fracture surgery, bony surgery like an osteotomy or fusion, or a surgery that requires bone healing, you are advised to take Vitamin D and Calcium to improve healing potential.  Vitamin D3 4000 units/day and Calcium 1200mg/day. These are over the counter medications so please pick them up at the pharmacy when you are picking up your prescriptions.    Activity:   Because of your recent foot surgery, your activity level will decrease. You will need to elevate your foot ABOVE the level of your heart for a minimum of four days. The length of time necessary for the swelling to go down, and for your wounds to heal properly depends greatly on your efforts here. Elevation is extremely important to avoid compromising the blood supply to your foot. Remember when your foot is down it will swell, which will increase pain and slow healing. Wiggle your toes frequently if possible.   If you go home with a regional block, (a type of anesthesia) the foot and leg will be numb. Think of ways to get into your house and around the house until the block wears off.   Keep in mind that it may be a legal issue if you drive while in a cast or splint, especially when the splint is on the right foot. You may call the Department of Motor Vehicles to schedule a road test if you have adaptive equipment applied to your car.   The amount of weight you are allowed to  bear on your foot will be written on your discharge sheet filled out at the time of surgery. The following is an explanation of the possibilities:     Non-weight bearing:   You are to put NO weight whatsoever on your foot. When using crutches or a walker, your foot should not touch the ground, except when you are standing. Then, it may rest on the ground. If you are to be non-weight bearing, and you are not compliant, you could compromise the surgery.     Some of our patients have been requesting prescriptions for a roll-a-bout knee scooter. [employer] and other insurances have been denying these claims, and you may either have to rent one or pay out of pocket to purchase one.

## 2024-02-26 NOTE — ANESTHESIA PROCEDURE NOTES
Peripheral Block    Patient location during procedure: holding area  Start time: 2/26/2024 9:29 AM  Reason for block: at surgeon's request and post-op pain management  Staffing  Performed by: Blaine Bui DO  Authorized by: Blaine Bui DO    Preanesthetic Checklist  Completed: patient identified, IV checked, site marked, risks and benefits discussed, surgical consent, monitors and equipment checked, pre-op evaluation and timeout performed  Peripheral Block  Patient position: supine  Prep: ChloraPrep  Patient monitoring: continuous pulse oximetry, frequent blood pressure checks and heart rate  Block type: Popliteal  Laterality: left  Injection technique: single-shot  Procedures: ultrasound guided, Ultrasound guidance required for the procedure to increase accuracy and safety of medication placement and decrease risk of complications.  Ultrasound permanent image savedbupivacaine (PF) (MARCAINE) 0.25 % injection 20 mL - Perineural   10 mL - 2/26/2024 9:29:00 AM  bupivacaine liposomal (EXPAREL) 1.3 % injection 20 mL - Perineural   20 mL - 2/26/2024 9:30:00 AM  fentanyl citrate (PF) 100 MCG/2ML 50 mcg - Intravenous   100 mcg - 2/26/2024 9:30:00 AM  midazolam (VERSED) injection 0.5 mg - Intravenous   2 mg - 2/26/2024 9:30:00 AM  Needle  Needle type: Stimuplex   Needle gauge: 20 G  Needle length: 4 in  Needle localization: ultrasound guidance  Catheter type: open end  Assessment  Injection assessment: frequent aspiration, injected with ease, negative aspiration, no paresthesia on injection, no symptoms of intraneural/intravenous injection, negative for heart rate change, needle tip visualized at all times and incremental injection  Paresthesia pain: none  Post-procedure:  site cleaned  patient tolerated the procedure well with no immediate complications  Additional Notes  Adductor block 15cc exparel and 5cc 0.25% bupiv  Adductor block 5cc exparel and 5cc 0.25% bupiv.

## 2024-02-26 NOTE — ANESTHESIA PREPROCEDURE EVALUATION
Procedure:  Medial slide calcaneal osteotomy, Achilles lengthening, FDL tendon transfer to the navicular, Cotton osteotomy and possible Camejo osteotomy (Left: Foot)    Relevant Problems   ANESTHESIA   (+) PONV (postoperative nausea and vomiting)      CARDIO   (+) Hyperlipidemia   (+) Hypertension      Other   (+) Obesity (BMI 30.0-34.9)        Physical Exam    Airway    Mallampati score: II  TM Distance: >3 FB  Neck ROM: full     Dental   No notable dental hx     Cardiovascular      Pulmonary      Other Findings  post-pubertal.      Anesthesia Plan  ASA Score- 2     Anesthesia Type- general with ASA Monitors.         Additional Monitors:     Airway Plan: LMA.    Comment: Adductor and Popliteal blocks with Exparel.       Plan Factors-    Chart reviewed.    Patient summary reviewed.    Patient is not a current smoker.              Induction- intravenous.    Postoperative Plan- Plan for postoperative opioid use.     Informed Consent- Anesthetic plan and risks discussed with patient and spouse.  I personally reviewed this patient with the CRNA. Discussed and agreed on the Anesthesia Plan with the CRNA..

## 2024-02-26 NOTE — INTERVAL H&P NOTE
H&P reviewed. After examining the patient I find no changes in the patients condition since the H&P had been written.    Vitals:    02/26/24 0754   BP: 127/65   Pulse: 72   Resp: 18   Temp: 97.9 °F (36.6 °C)   SpO2: 94%     Plan for joint sparing flatfoot correction left foot.

## 2024-02-26 NOTE — OP NOTE
OPERATIVE REPORT  PATIENT NAME: Treasure Chung    :  1965  MRN: [id number]  Pt Location: [facility] OR ROOM 03    SURGERY DATE: 2024    Surgeons and Role:     * James R Lachman, MD - Primary  STEVENSON Vazquez- assisting    Preop Diagnosis:  Posterior tibial tendon dysfunction (PTTD) of left lower extremity [M76.822]    Post-Op Diagnosis Codes:     * Posterior tibial tendon dysfunction (PTTD) of left lower extremity [M76.822]    Procedure(s):  Left - Medial slide calcaneal osteotomy. Achilles lengthening. FDL tendon transfer to the navicular. Cotton osteotomy and possible Camejo osteotomy    Specimen(s):  * No specimens in log *    Estimated Blood Loss:   Minimal    Drains:  * No LDAs found *    Anesthesia Type:   Choice    Operative Indications:  Posterior tibial tendon dysfunction (PTTD) of left lower extremity [M76.822]      Operative Findings:  Consistent with diagnosis    Complications:   None    Procedure and Technique:  Beatriz Gastrocnemius recession  Medial slide calcaneal osteotomy  FDL tendon transfer to the navicular  Repair of spring ligament  Cotton osteotomy (Medial cuneiform, dorsal opening wedge osteotomy)  Fluoroscopy without benefit of radiologist  Placement into short leg nonweightbearing plaster splint.    An esmarch was used to exsanguinate the leg and the tourniquet was inflated to 250mm Hg.  See anesthesia documentation for tourniquet time.      We first performed the Beatriz gastrocnemius recession. She had an equinus contracture and could not be dorsiflexed past neutral so the decision was made to lengthen his gastrocnemius.  A 2.5cm incision was made 10cm above the medial malleolus medially in the leg. We used blunt dissection to encounter the fascia of the superficial posterior compartment. We entered this compartment using a knife and the  the gastrocnemius tendon from the intact soleus muscle belly.  We released the gastrocnemius tendon by carefully exposing it using two  [employer] retractions and only cutting under direct visualization.  Once we completed the gastrocnemius recession, we then carefully dorsiflexed the ankle and were able to achieve 20 degrees ankle dorsiflexion.    Attention was turned to the posterolateral heel.  A longitudinal incision was made diagonally along the anticipated trajectory of the Calcaneal slide osteotomy.  Sharp dissection was taken through the skin and bovie electrocautery was used to control hemostasis.  Scissor dissection was used for the deep dissection using caution to protect the sural nerve. We used the knife and periosteal elevator to meri the area for our osteotomy.  We confirmed this on Xray.  We then used the microsagittal saw to make our osteotomy and completed it with an osteotome.  Using a lamina  with no teeth, we stretched the soft tissues to allow for easy translation. We used the freer elevator to free any medial periosteum still attached to the fragment.  We translated the calcaneal tuberosity 1cm medially and provisionally fixed it with guidewires for the 7.0mm cannulated screws. We confirmed position of the osteotomy and screws on flouroscopy and then placed the appropriate length screws.       We next made a linear incision over the posterior tibial tendon. There was extensive degeneration of the tendon.  This was removed.  The bursa was debrided.  Next, we found the FDL tendon and traced it distally until we felt we had enough length. We cut it distally.      We noted a tear of the spring ligament off the navicular.  We repaired this using 2-0 ethibond suture in interrupted figure of eight fashion.    We used the guide pin for the biotenodesis set and confirmed its position, center center in and AP and lateral Xray of the navicular.  We then whip stitched and measured the tendon.  We reamed the appropriate size hole to accept the screw and tendon.  We then appropriately tensioned the FDL and when we were satisfied, we  "locked the tendon transfer in place in the tunnel with the biotenodesis screw.    The forefoot was supinated now that we had corrected the hindfoot valgus and the forefoot abductus so we made another dorsal incision over the medial cuneiform.  We made a cotton osteotomy in the mid portion of the cuneiform parallel to the NC joint and the TMT joint.  We preserved the plantar cortex to act as a hinge.  We placed the appropriate sized trial into the osteotomy and were happy with restoring the \"tripod\" weightbearing surface of the plantar foot.  The actual implant was opened and packed with bone graft and impacted into place.  We used the remaining bone graft to fill the rest of the osteotomy site.    We confirmed all hardware position on Xray. We restored Mearys line and the calcaneal pitch.     Copious irrigation was used.  Vancomycin powder was used in all wounds.  The wounds were closed sequentially in layers starting with 2-0 vicryl, then 4-0 vicryl and 3-0 nylon for skin.  A sterile dressing was applied and a posterior-U splint placed to protect the tendon transfer.     I was present for the entire procedure.  A qualified resident physician was not available., and A physician assistant was required during the procedure for retraction, tissue handling, dissection and suturing.    Patient Disposition:  PACU         SIGNATURE: James R Lachman, MD  DATE: February 26, 2024  TIME: 9:17 AM                "

## 2024-02-29 ENCOUNTER — TELEPHONE (OUTPATIENT)
Age: 59
End: 2024-02-29

## 2024-02-29 NOTE — TELEPHONE ENCOUNTER
Caller: Patient    Doctor: Lachman    Reason for call: Patient calling for clarification on answer from [employer] message.  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  She would like to let her work know what the plan will be by Monday.    Additionally, patient has a question regarding medication.  Patient was taking Trazodone previously, but has not been taking it due to the prescribed Oxycodone.  She would like to know if she can resume Trazodone, or if she should wait until she is completed with the Oxycodone to resume it.    Please call patient back to advise.    Call back#: [phone]

## 2024-03-01 NOTE — TELEPHONE ENCOUNTER
Called and talked to the patient letting her know jessi message. She had some more concerns that she would like to be addressed. She wants to know if it would be better to go back full day or half day for work she works at home. She would also like a work note with half day or full day so she can give it to her employer. Also she would like to know if she takes her trazodone how long should she wait until she can take the oxycodone if she needs to take them.

## 2024-03-01 NOTE — TELEPHONE ENCOUNTER
Caller: Patient    Doctor: Lachman    Reason for call: Patient is calling back asking for an update on the previous message. She is going to need a work status note, but isn't sure is she should return to work FT or PT. Please advise.    Call back#: [phone]

## 2024-03-04 ENCOUNTER — TELEPHONE (OUTPATIENT)
Dept: OBGYN CLINIC | Facility: HOSPITAL | Age: 59
End: 2024-03-04

## 2024-03-04 NOTE — TELEPHONE ENCOUNTER
Caller: Patient    Reason for call: Patient stated she needs this letter today and sent to her mychart. I apologized advised PA and physician are in surgery today. If this can be done she would appreciate it.     Call back#: [phone]

## 2024-03-04 NOTE — TELEPHONE ENCOUNTER
Called and talked to the patient. She would like a work note for the 3 weeks saying that she will be out. She is also asking if we could put in there if she is better if she can return early.

## 2024-03-04 NOTE — TELEPHONE ENCOUNTER
"Called and spoke with pt, pt had a L PTTD on 2/26/24. Pt states her two toes feel numb but patient is able to move them. Pt states they are not tingly. Pt states its like a \"dead feeling\" PT states she notices it the most on her big toe. She notices pressure back by her ankles. Pt states Friday night is when she could first feel her foot as her nerve block wore off. Pt states she had anxiety, Nausea and vomiting and felt like her foot was being crushed but it has gotten better. She has been elevating her foot all weekend. Pt is currently elevating her foot now. Pt states when she puts her foot down there is a lot of pressure in her foot (she is using a knee scooter for transferring). Pt states there is a tightness around the ankle. Pt states she is taking pain medication and she has not been using too much of the oxycodone (taking as prescribed) she did take one today for pain relief. Pt is taking ES tylenol for pain relief. Pt states the toes sticking out are normal temperature, normal skin color. Capillary refill is adequate. Denies increase in swelling.     Please advise, thank you!   "

## 2024-03-04 NOTE — TELEPHONE ENCOUNTER
Lachman       Pt is requesting a note stating to excuse her from work. PO 2/26. Is to remain out of work for 3 weeks.     Currently feeling very uncomfortable. Toes are are currently numb and feeling a lot of pressure at the ankle. Would like to know if its normal.   Toes are normal color, able to move them, swelling has slowly gone down since Friday      Callback:  [phone]

## 2024-03-05 ENCOUNTER — TELEPHONE (OUTPATIENT)
Age: 59
End: 2024-03-05

## 2024-03-20 ENCOUNTER — OFFICE VISIT (OUTPATIENT)
Dept: OBGYN CLINIC | Facility: CLINIC | Age: 59
End: 2024-03-20

## 2024-03-20 VITALS — WEIGHT: 215 LBS | BODY MASS INDEX: 35.82 KG/M2 | HEIGHT: 65 IN

## 2024-03-20 DIAGNOSIS — M76.822 POSTERIOR TIBIAL TENDON DYSFUNCTION (PTTD) OF LEFT LOWER EXTREMITY: Primary | ICD-10-CM

## 2024-03-20 PROCEDURE — 99024 POSTOP FOLLOW-UP VISIT: CPT | Performed by: ORTHOPAEDIC SURGERY

## 2024-03-20 NOTE — PATIENT INSTRUCTIONS
"Nonweightbearing     Continue aspirin/lovenox to prevent blood clots  Purchase a compression stocking (Knee high, 20-30mm Hg) to be worn at all times while awake for your next visit when the cast comes off.  Recommend taking the following supplements: Vitamin D3 4000 units per day and Calcium 1200 mg per day. This will help with bone healing.    Care of Casts and Splints    Casts and splints support and protect injured bones and soft tissue. When you break a bone, your doctor will put the pieces back together in the right position. Casts and splints hold the bones in place while they heal. They also reduce pain, swelling, and muscle spasm.    In some cases, splints and casts are applied following surgery.    Splints or \"half-casts\" provide less support than casts. However, splints can be adjusted to accommodate swelling from injuries easier than enclosed casts. Your doctor will decide which type of support is best for you.    Types of Splints and Casts  Casts are custom-made. They must fit the shape of your injured limb correctly to provide the best support. Casts can be made of plaster or fiberglass -- a plastic that can be shaped.  Splints or half-casts can also be custom-made, especially if an exact fit is necessary. Other times, a ready-made splint will be used. These off-the-shelf splints are made in a variety of shapes and sizes, and are much easier and faster to use. They have Velcro straps which make the splints easy to put on, take off, and adjust.    Materials  Fiberglass or plaster materials form the hard supportive layer in splints and casts.  Fiberglass is lighter in weight and stronger than plaster. In addition, x-rays can \"see through\" fiberglass better than through plaster. This is important because your doctor will probably schedule additional x-rays after your splint or cast has been applied. X-rays can show whether the bones are healing well or have moved out of place.  Plaster is less expensive " "than fiberglass and shapes better than fiberglass for some uses.    Application  Both fiberglass and plaster splints and casts use padding, usually cotton, as a protective layer next to the skin. Both materials come in strips or rolls which are dipped in water and applied over the padding covering the injured area.  The splint or cast must fit the shape of the injured arm or leg correctly to provide the best possible support. Generally, the splint or cast also covers the joint above and below the broken bone.  In many cases, a splint is applied to a fresh injury first. As swelling subsides, a full cast may replace the splint.  Sometimes, it may be necessary to replace a cast as swelling goes down and the cast gets \"too big.\" As a fracture heals, the cast may be replaced by a splint to make it easier to perform physical therapy exercises.        Apply ice to the splint or cast and elevate your leg to reduce swelling.  Warning Signs  Swelling can create a lot of pressure under your cast. This can lead to problems. If you experience any of the following symptoms, contact your doctor's office immediately for advice.  Increased pain and the feeling that the splint or cast is too tight. This may be caused by swelling.   Numbness and tingling in your hand or foot. This may be caused by too much pressure on the nerves.   Burning and stinging. This may be caused by too much pressure on the skin.   Excessive swelling below the cast. This may mean the cast is slowing your blood circulation.   Loss of active movement of toes or fingers. This requires an urgent evaluation by your doctor.      Getting Used to a Splint or Cast  Swelling due to your injury may cause pressure in your splint or cast for the first 48 to 72 hours. This may cause your injured arm or leg to feel snug or tight in the splint or cast. If you have a splint, your doctor will show you how to adjust it to accommodate the swelling.  It is very important to keep " "the swelling down. This will lessen pain and help your injury heal. To help reduce swelling:  Elevate. It is very important to elevate your injured arm or leg for the first 24 to 72 hours. Prop your injured arm or leg up above your heart by putting it on pillows or some other support. You will have to recline if the splint or cast is on your leg. Elevation allows clear fluid and blood to drain \"downhill\" to your heart.   Exercise. Move your uninjured, but swollen fingers or toes gently and often. Moving them often will prevent stiffness.   Ice. Apply ice to the splint or cast. Place the ice in a dry plastic bag or ice pack and loosely wrap it around the splint or cast at the level of the injury. Ice that is packed in a rigid container and touches the cast at only one point will not be effective.  Taking Care of Your Splint or Cast  Your doctor will explain any restrictions on using your injured arm or leg while it is healing. You must follow your doctor's instructions carefully to make sure your bone heals properly. The following information provides general guidelines only, and is not a substitute for your doctor's advice.  After you have adjusted to your splint or cast for a few days, it is important to keep it in good condition. This will help your recovery.  Keep your splint or cast dry. Moisture weakens plaster and damp padding next to the skin can cause irritation. Use two layers of plastic or purchase waterproof shields to keep your splint or cast dry while you shower or bathe. Even if the cast is covered, do not submerge it or hold it under running water. A small pinhole in the cast cover can cause the injury to get soaked.   Walking casts. Do not walk on a \"walking cast\" until it is completely dry and hard. It takes about one hour for fiberglass, and two to three days for plaster to become hard enough to walk on.   Avoid dirt. Keep dirt, sand, and powder away from the inside of your splint or cast. " "  Padding. Do not pull out the padding from your splint or cast.   Itching. Do not stick objects such as coat hangers inside the splint or cast to scratch itching skin. Do not apply powders or deodorants to itching skin. If itching persists, contact your doctor.   Trimming. Do not break off rough edges of the cast or trim the cast before asking your doctor.   Skin. Inspect the skin around the cast. If your skin becomes red or raw around the cast, contact your doctor.   Inspect the cast regularly. If it becomes cracked or develops soft spots, contact your doctor's office.  Use common sense. You have a serious injury and you must protect your cast from damage so it can protect your injury while it heals.  After the initial swelling has subsided, proper splint or cast support will usually allow you to continue your daily activities with a minimum of inconvenience.  Cast Removal  Never remove the cast yourself. You may cut your skin or prevent proper healing of your injury.  Your doctor will use a cast saw to remove your cast. The saw vibrates, but does not rotate. If the blade of the saw touches the padding inside the hard shell of the cast, the padding will vibrate with the blade and will protect your skin. Cast saws make noise and may feel \"hot\" from friction, but will not harm you -- \"their bark is worse than their bite.\"  If you do feel pain while the cast is being removed, let your doctor or an assistant know and they will be able to make adjustments.  The saw vibrates but does not rotate. Cast saws make noise but will not harm you.      "

## 2024-03-20 NOTE — PROGRESS NOTES
"      James R Lachman, M.D.  Attending, Orthopaedic Surgery  Foot and Ankle  [facility]      ORTHOPAEDIC FOOT AND ANKLE POST-OP VISIT     Procedure:      Left FDL tendon transfer to the navicular, Cotton and medial calcaneal slide osteotomies and achilles lengthening.       Date of surgery:   2/26/24      PLAN  1. Weightbearing Status - NWB operative extremity  2. DVT prophylaxis - ASA 325mg BID  3. Continue to elevate 23 hrs/day getting up 1x per hour to prevent a blood clot  4. Pain control - OTC pain medication  5. RTC in 3 weeks  6. Xrays needed next visit - Yes flatfoot series     History of Present Illness:   Chief Complaint:   Chief Complaint   Patient presents with    Post-op     Post op of the left ankle. Splint off stiches out.       Treasure Chung is a 58 y.o. female who is being seen for 3 week post-operative visit for the above procedure. Pain is well controlled and the patient has successfully transitioned to OTC pain medicines.  she is taking ASA 325mg BID for DVT prophylaxis. Patient has been NWB in a Splint      Review of Systems:  General- denies fever/chills  Respiratory- denies cough or SOB  Cardio- denies chest pain or palpitations  GI- denies abdominal pain  Musculoskeletal- Negative except noted above  Skin- denies rashes or wounds    Physical Exam:   Ht 5' 5\" (1.651 m)   Wt 97.5 kg (215 lb)   BMI 35.78 kg/m²   General/Constitutional: No apparent distress: well-nourished and well developed.  Eyes: normal ocular motion  Lymphatic: No appreciable lymphadenopathy  Respiratory: Non-labored breathing  Vascular: No edema, swelling or tenderness, except as noted in detailed exam.  Integumentary: No impressive skin lesions present, except as noted in detailed exam.  Neuro: No ataxia or tremors noted  Psych: Normal mood and affect, oriented to person, place and time. Appropriate affect.  Musculoskeletal: Normal, except as noted in detailed exam and in HPI.    Examination    left "        Incision Clean, dry, intact  Sutures Removed this visit    Ecchymosis none    Swelling mild    Sensation Intact to light touch throughout sural, saphenous, superficial peroneal, deep peroneal and medial/lateral plantar nerve distributions.  [place]-Loren 5.07 filament (10g) testing deferred.    Cardiovascular Brisk capillary refill < 2 seconds,intact DP and PT pulses    Special Tests None      Imaging Studies:   No new imaging    Scribe Attestation      I,:  Sandor Carter am acting as a scribe while in the presence of the attending physician.:       I,:  James R Lachman, MD personally performed the services described in this documentation    as scribed in my presence.:                James R. Lachman, MD  Foot & Ankle Surgery   Department of Orthopaedic Surgery  [facility]      I personally performed the service.    James R. Lachman, MD

## 2024-03-20 NOTE — LETTER
March 20, 2024     Patient: Treasure Chung  YOB: 1965  Date of Visit: 3/20/2024      To Whom it May Concern:    Treasure Chung is under my professional care. Treasure was seen in my office on 3/20/2024. Treasure may return to light/desk duty only on 3/22/24.    If you have any questions or concerns, please don't hesitate to call.         Sincerely,          James R Lachman, MD        CC: No Recipients   HM; pt rec'd seated in chair; HR 72; pt denied pain, but c/o headache; SAMARA Lea made aware

## 2024-03-21 ENCOUNTER — TELEPHONE (OUTPATIENT)
Dept: OBGYN CLINIC | Facility: MEDICAL CENTER | Age: 59
End: 2024-03-21

## 2024-03-21 NOTE — TELEPHONE ENCOUNTER
Caller: Michaela    Doctor: Dr Lachman     Reason for call: The patient is calling and asking for a new return to work note date 3/22 ,stating that she may return to full duty. Do to working from home her employer will not accept light/desk duty, it will need to say full duty. Please place into my chart. Thank you     Call back#: [phone]

## 2024-03-30 ENCOUNTER — APPOINTMENT (OUTPATIENT)
Dept: RADIOLOGY | Facility: MEDICAL CENTER | Age: 59
End: 2024-03-30
Attending: PHYSICIAN ASSISTANT
Payer: COMMERCIAL

## 2024-03-30 ENCOUNTER — OFFICE VISIT (OUTPATIENT)
Dept: URGENT CARE | Facility: MEDICAL CENTER | Age: 59
End: 2024-03-30
Payer: COMMERCIAL

## 2024-03-30 VITALS
TEMPERATURE: 98.3 F | SYSTOLIC BLOOD PRESSURE: 131 MMHG | OXYGEN SATURATION: 96 % | RESPIRATION RATE: 18 BRPM | DIASTOLIC BLOOD PRESSURE: 61 MMHG | HEART RATE: 87 BPM
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
## 2024-03-30 DIAGNOSIS — M25.541 PAIN IN JOINT OF RIGHT HAND: Primary | ICD-10-CM

## 2024-03-30 PROCEDURE — 99213 OFFICE O/P EST LOW 20 MIN: CPT | Performed by: PHYSICIAN ASSISTANT

## 2024-03-30 PROCEDURE — 73110 X-RAY EXAM OF WRIST: CPT

## 2024-03-30 PROCEDURE — 73130 X-RAY EXAM OF HAND: CPT

## 2024-03-30 NOTE — PROGRESS NOTES
[facility]        NAME: Treasure Chung is a 59 y.o. female  : 1965    MRN: [id number]  DATE: 2024  TIME: 6:09 PM    Assessment and Plan   Pain in joint of right hand [M25.541]  1. Pain in joint of right hand  Ambulatory Referral to Orthopedic Surgery    Orthopedic injury treatment      2. Fall, initial encounter  XR hand 3+ vw right    XR wrist 3+ vw right            Patient Instructions       Follow up with orthopedics.    If tests have been performed at [facility], our office will contact you with results if changes need to be made to the care plan discussed with you at the visit.  You can review your full results on [facility].    Chief Complaint     Chief Complaint   Patient presents with    Hand Pain     Patient c/o right hand pain and wrist pain after she fell down when going down stairs today.          History of Present Illness       Patient fell few hours ago onto her right dominant hand.  Complaining of right hand and wrist pain on the radial side.    Hand Pain   The incident occurred 1 to 3 hours ago. The incident occurred at home. The injury mechanism was a fall and a direct blow. The pain is present in the right hand and right wrist. The quality of the pain is described as aching. The pain does not radiate. The pain is moderate. The pain has been Constant since the incident. Pertinent negatives include no chest pain, muscle weakness, numbness or tingling. The symptoms are aggravated by movement, lifting and palpation. She has tried nothing for the symptoms. The treatment provided no relief.       Review of Systems   Review of Systems   Cardiovascular:  Negative for chest pain.   Neurological:  Negative for tingling and numbness.   All other systems reviewed and are negative.        Current Medications       Current Outpatient Medications:     aspirin (ECOTRIN) 325 mg EC tablet, Take 1 tablet (325 mg total) by mouth 2 (two) times a day, Disp: 84 tablet, Rfl: 0     buPROPion (WELLBUTRIN XL) 300 mg 24 hr tablet, Take 300 mg by mouth daily, Disp: , Rfl:     calcium carbonate (OS-MARLON) 1250 (500 Ca) MG tablet, Take 1 tablet by mouth daily, Disp: , Rfl:     cholecalciferol (VITAMIN D3) 1,000 units tablet, Take 2,000 Units by mouth daily , Disp: , Rfl:     lisinopril-hydrochlorothiazide (PRINZIDE,ZESTORETIC) 10-12.5 MG per tablet, Take 1 tablet by mouth daily, Disp: , Rfl:     ondansetron (ZOFRAN) 4 mg tablet, Take 1 tablet (4 mg total) by mouth every 8 (eight) hours as needed for nausea or vomiting, Disp: 10 tablet, Rfl: 0    oxyCODONE (Roxicodone) 5 immediate release tablet, Take 1 tablet (5 mg total) by mouth every 4 (four) hours as needed for severe pain for up to 30 doses Max Daily Amount: 30 mg, Disp: 30 tablet, Rfl: 0    traZODone (DESYREL) 150 mg tablet, Take 150 mg by mouth daily at bedtime, Disp: , Rfl:     Current Allergies     Allergies as of 03/30/2024 - Reviewed 03/30/2024   Allergen Reaction Noted    Ciprofloxacin Rash 09/11/2017    Medical tape Itching, Rash, and Blisters 02/01/2004    Other Allergic Rhinitis 03/01/2017            The following portions of the patient's history were reviewed and updated as appropriate: allergies, current medications, past family history, past medical history, past social history, past surgical history and problem list.     Past Medical History:   Diagnosis Date    BCC (basal cell carcinoma of skin)     Hyperlipidemia     Hypertension     PONV (postoperative nausea and vomiting) 2/25/2024       Past Surgical History:   Procedure Laterality Date    BASAL CELL CARCINOMA EXCISION Left     nose    BELPHAROPTOSIS REPAIR      HAND SURGERY      HYSTERECTOMY      MOHS RECONSTRUCTION N/A 02/24/2021    Procedure: REPAIR MOH'S DEFECT OF NOSE;  Surgeon: Rolf Vazquez MD;  Location:  MAIN OR;  Service: Plastics    KS OSTEOTOMY CALCANEUS W/WO INTERNAL FIXATION Left 2/26/2024    Procedure: Medial slide calcaneal osteotomy, Achilles  lengthening, FDL tendon transfer to the navicular, Cotton osteotomy;  Surgeon: James R Lachman, MD;  Location:  MAIN OR;  Service: Orthopedics    TUBAL LIGATION      WISDOM TOOTH EXTRACTION      WRIST SURGERY         Family History   Problem Relation Age of Onset    Hypertension Mother     Diabetes Mother     Heart failure Mother     Hyperlipidemia Mother     Hyperlipidemia Father     Stroke Father     Hypertension Father     Diabetes Father     Basal cell carcinoma Father     Hyperlipidemia Sister     Hypertension Sister     Diabetes Sister     Kidney failure Sister     Hyperlipidemia Sister     Hyperlipidemia Brother     Hypertension Brother     Diabetes Brother     Cancer Brother     Hyperlipidemia Brother     Heart disease Brother     Hypertension Brother     Stroke Maternal Aunt     Heart disease Maternal Grandmother     Stroke Maternal Grandmother     Diabetes Maternal Grandfather     Diabetes Paternal Grandmother     Ovarian cancer Paternal Grandmother     Diabetes Paternal Grandfather     Breast cancer Cousin     Heart disease Other     Leukemia Other     Thyroid disease Neg Hx          Medications have been verified.        Objective   /61   Pulse 87   Temp 98.3 °F (36.8 °C)   Resp 18   SpO2 96%   No LMP recorded. Patient is postmenopausal.       Physical Exam     Physical Exam  Vitals and nursing note reviewed.   Constitutional:       Appearance: Normal appearance. She is normal weight.   Cardiovascular:      Rate and Rhythm: Normal rate and regular rhythm.      Pulses: Normal pulses.      Heart sounds: Normal heart sounds.   Pulmonary:      Effort: Pulmonary effort is normal.      Breath sounds: Normal breath sounds.   Neurological:      Mental Status: She is alert.         Right hand no tenderness right wrist positive snuffbox tenderness with +1 edema.  Neurovascularly intact.

## 2024-04-01 ENCOUNTER — OFFICE VISIT (OUTPATIENT)
Dept: OBGYN CLINIC | Facility: CLINIC | Age: 59
End: 2024-04-01
Payer: COMMERCIAL

## 2024-04-01 VITALS
BODY MASS INDEX: 35.82 KG/M2 | WEIGHT: 215 LBS | DIASTOLIC BLOOD PRESSURE: 61 MMHG | SYSTOLIC BLOOD PRESSURE: 130 MMHG | HEIGHT: 65 IN

## 2024-04-01 DIAGNOSIS — M25.541 PAIN IN JOINT OF RIGHT HAND: ICD-10-CM

## 2024-04-01 DIAGNOSIS — S63.501A SPRAIN OF RIGHT WRIST, INITIAL ENCOUNTER: ICD-10-CM
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
## 2024-04-01 DIAGNOSIS — S63.641A SPRAIN OF METACARPOPHALANGEAL (MCP) JOINT OF RIGHT THUMB, INITIAL ENCOUNTER: Primary | ICD-10-CM

## 2024-04-01 PROCEDURE — 99214 OFFICE O/P EST MOD 30 MIN: CPT | Performed by: FAMILY MEDICINE

## 2024-04-01 NOTE — PROGRESS NOTES
Assessment:     1. Sprain of metacarpophalangeal (MCP) joint of right thumb, initial encounter        2. Pain in joint of right hand  Ambulatory Referral to Orthopedic Surgery      3. Fall, initial encounter        4. Sprain of right wrist, initial encounter          No orders of the defined types were placed in this encounter.       Impression:   Right hand pain likely secondary to Thumb sprain.    Right wrist pain likely secondary to wrist sprain.  Date of injury: 03/30/2024.  Mechanism of injury: Fall downstairs  Follow-up from injury: 2 days    Conservative Management   We discussed different treatment options:  Reviewed care now documentation completed on 3/30/2024.  Treatment plan consisted of x-rays and referral to Ortho/sports med  Ice or Heat Therapy as needed 1-2 times daily for 10-20 minutes. As tolerated.   Over the counter Tylenol and/or NSAIDs  as needed based off your Past Medical Hx. Please follow product label for dosing and maximum limits.    Will continue with bracing at this time.         Imaging   Reviewed prior xrays obtain in Urgent or Emergency Department. These were reviewed in office with patient today.   03/30/2024 right wrist x-ray: No acute osseous abnormality  03/30/2024 right hand x-ray: No acute osseous abnormality  Will consider scaphoid view at next appointment if tenderness remains in the snuffbox/scaphoid.    Procedure  Not appropriate at this time.     Shared decision making, patient agreeable to plan.      Return for Follow up after 2 wks.    HPI:   Treasure Chung is a right-hand-dominant 59 y.o. female  who presents for evaluation of   Chief Complaint   Patient presents with    Right Hand - Pain, Swelling, Numbness       Occupation: Seated/computer positions  Injury Related: Yes, Date of Injury: 2 days    Onset/Mechanism: Patient was utilizing knee scooter for lower left extremity cast.  When she had a mechanical fall.  Bracing with her right hand.  Thumb pain  instantly.  Location: First second and third carpal and phalanges.  Severity: Current severity: 6-7/10.  Pain described as: Ache, sharp   Radiation: Will radiate up into the mid forearm.  Does not radiate up into the elbow.  Provocative: All hand motions..  Associated symptoms: Swelling and bruising..    Denies any hx of fracture of affected limb.   History for surgery, 8 to 10 years ago  Hx surgery for lipoma removal, during high school.    Summary of treatment to-date:   Rest   Brace  Ice therpay      Following History Reviewed and Updated     Past Medical History:   Diagnosis Date    BCC (basal cell carcinoma of skin)     Hyperlipidemia     Hypertension     PONV (postoperative nausea and vomiting) 2/25/2024     Past Surgical History:   Procedure Laterality Date    BASAL CELL CARCINOMA EXCISION Left     nose    BELPHAROPTOSIS REPAIR      HAND SURGERY      HYSTERECTOMY      MOHS RECONSTRUCTION N/A 02/24/2021    Procedure: REPAIR MOH'S DEFECT OF NOSE;  Surgeon: Rolf Vazquez MD;  Location:  MAIN OR;  Service: Plastics    DE OSTEOTOMY CALCANEUS W/WO INTERNAL FIXATION Left 2/26/2024    Procedure: Medial slide calcaneal osteotomy, Achilles lengthening, FDL tendon transfer to the navicular, Cotton osteotomy;  Surgeon: James R Lachman, MD;  Location:  MAIN OR;  Service: Orthopedics    TUBAL LIGATION      WISDOM TOOTH EXTRACTION      WRIST SURGERY       Family History   Problem Relation Age of Onset    Hypertension Mother     Diabetes Mother     Heart failure Mother     Hyperlipidemia Mother     Hyperlipidemia Father     Stroke Father     Hypertension Father     Diabetes Father     Basal cell carcinoma Father     Hyperlipidemia Sister     Hypertension Sister     Diabetes Sister     Kidney failure Sister     Hyperlipidemia Sister     Hyperlipidemia Brother     Hypertension Brother     Diabetes Brother     Cancer Brother     Hyperlipidemia Brother     Heart disease Brother     Hypertension Brother     Stroke  Maternal Aunt     Heart disease Maternal Grandmother     Stroke Maternal Grandmother     Diabetes Maternal Grandfather     Diabetes Paternal Grandmother     Ovarian cancer Paternal Grandmother     Diabetes Paternal Grandfather     Breast cancer Cousin     Heart disease Other     Leukemia Other     Thyroid disease Neg Hx        Social History     Substance and Sexual Activity   Alcohol Use Yes    Alcohol/week: 1.0 - 2.0 standard drink of alcohol    Types: 1 - 2 Standard drinks or equivalent per week    Comment: occasional     Social History     Substance and Sexual Activity   Drug Use Never     Social History     Tobacco Use   Smoking Status Former    Current packs/day: 0.00    Average packs/day: 1 pack/day for 19.0 years (19.0 ttl pk-yrs)    Types: Cigarettes    Start date:     Quit date:     Years since quittin.2   Smokeless Tobacco Never       Social Determinants of Health     Tobacco Use: Medium Risk (2024)    Patient History     Smoking Tobacco Use: Former     Smokeless Tobacco Use: Never     Passive Exposure: Not on file   Alcohol Use: Not At Risk (2023)    Received from [facility]    AUDIT-C     Frequency of Alcohol Consumption: Monthly or less     Average Number of Drinks: 1 or 2     Frequency of Binge Drinking: Never   Financial Resource Strain: Low Risk  (8/10/2023)    Received from [facility]    Overall Financial Resource Strain (CARDIA)     Difficulty of Paying Living Expenses: Not hard at all   Food Insecurity: No Food Insecurity (8/10/2023)    Received from [facility]    Hunger Vital Sign     Worried About Running Out of Food in the Last Year: Never true     Ran Out of Food in the Last Year: Never true   Transportation Needs: No Transportation Needs (8/10/2023)    Received from [facility]    PRAPARE - Transportation     Lack of Transportation (Medical): No     Lack of Transportation (Non-Medical): No   Physical  Activity: Not on file   Stress: No Stress Concern Present (5/2/2023)    Received from [facility]    [place] [employer] - Occupational Stress Questionnaire     Feeling of Stress : Only a little   Social Connections: Moderately Integrated (5/2/2023)    Received from [facility]    Social Connection and Isolation Panel [NHANES]     Frequency of Communication with Friends and Family: More than three times a week     Frequency of Social Gatherings with Friends and Family: Once a week     Attends Rastafari Services: 1 to 4 times per year     Active Member of Clubs or Organizations: No     Attends Club or Organization Meetings: Never     Marital Status:    Intimate Partner Violence: Not At Risk (8/10/2023)    Received from [facility]    Humiliation, Afraid, Rape, and Kick questionnaire     Fear of Current or Ex-Partner: No     Emotionally Abused: No     Physically Abused: No     Sexually Abused: No   Depression: Not at risk (2/7/2024)    Received from [facility]    PHQ-2     PHQ-2 Score: 0   Housing Stability: Low Risk  (8/10/2023)    Received from [facility]    Housing Stability Vital Sign     Unable to Pay for Housing in the Last Year: No     Number of Places Lived in the Last Year: 1     Unstable Housing in the Last Year: No   Utilities: Not on file   Health Literacy: Not on file        Allergies   Allergen Reactions    Ciprofloxacin Rash    Medical Tape Itching, Rash and Blisters     Paper tape tolerated    Other Allergic Rhinitis     Nasal symptoms       Review of Systems      Review of Systems     Review of Systems   Constitutional: Negative for chills and fever.   HENT: Negative for drooling and sneezing.    Eyes: Negative for redness.   Respiratory: Negative for cough and wheezing.    Gastrointestinal: Negative for vomiting.   Psychiatric/Behavioral: Negative for behavioral problems. The patient is  "not nervous/anxious.      All other systems negative.   Physical Exam   Physical Exam    Vitals and nursing note reviewed.  Constitutional:   Appearance. Normal Appearance.  /61   Ht 5' 5\" (1.651 m)   Wt 97.5 kg (215 lb)   BMI 35.78 kg/m²     Body mass index is 35.78 kg/m².   HENT:  Head: Atraumatic.  Nose: Nose normal  Eyes: Conjunctiva/sclera: Conjunctivae normal.  Cardiovascular:   Rate and Rhythm: Bilateral equal distal pulses  Pulmonary:   Effort: Pulmonary effort is normal  Skin:   General: Skin is warm and dry.  Neurological:   General: No focal deficit present.  Mental Status: Alert and oriented to person, place, and time.   Psychiatric:   Mood and Affect: mood normal.  Behavior: Behavior normal     Musculoskeletal Exam     Ortho Exam       Right WRIST/ HAND:    INSPECTION:  Erythema Swelling Ecchymosis Increased Warmth Adduction contracture of thumb Hyperextension deformity of MCP joint   Neg. positive. positive Neg. No No     PALPATION:   Crepitus   Neg.     TENDERNESS:  Distal Phalanx 1-5 PIP Joint 1-5 MCP Joint 1-5 Snuffbox Scaphoid CMC joint  (Radial and dorsal aspect of thumb) Volar surface of the ulnar had and pisiform   Neg. Neg. 1st MCP with tenderness, as well as 2nd and 3rd . Pos.. Pos.         ACTIVE ROM:  Flexion Extension Opposition Malrotation Varus stress Valgus stress DRUJ   full and symmetrical of elbow  full and symmetrical of elbow  intact Neg.   (thumb perpendicular to remainig phalanges on adduction) No laxity or pain of thumb, discomfort present No laxity or pain of thumb, discomfort present Stable     STRENGTH:  Proximal Phalanx Strength (ulnar n.): (1-3 phalanx) Distal Phalanx Strength (median n.)  (1-3 phalanx) Finger Abduction strength Thumb Extension strength  Thumb Adduction strength (ulnar n): OK sign Froment Sign:   5/5 flexion, extension 5/5 flexion, extension 4/5 discomfort  4/5, discomfort   Able to form full [place] without triangle         SPECIAL TEST:  Axial load " "compression Metacarpal load shift test: A1 pulley Finkelstein's maneuver   Discomfort             Special Test:   Collateral ligament stability testing of the MCP joint  IP joint  MCP, PIP and distal interphalangeal joint  Yves test FDP tendon assessed   performed at 90 degrees of flexion stressed in extension and at 30 degrees of flexion assessed flexor and extensor tendons separately (1-3 phalanges) (Flex PIP joint in 90 degrees at the table, patient attempts extension of middle phalanx against resistance,) (1-3 phalanges)  PIP joint held in full extension, patient actively flex DIP joint)   Pain at 1st MCP no pain at 2nd or 3rd MCP No laxity or pain No concern for tendon injury flexor and extensor tendons tested Without weak PIP extension and DIP remains lax      Neurovascular:  Sensation to light touch Radial artery   Intact and equal bilaterally Intact and equal bilaterally     (Test not completed if space left blank )           Procedures       Portions of the record may have been created with voice recognition software. Occasional wrong word or \"sound alike\" substitutions may have occurred due to the inherent limitations of voice recognition software. Please review the chart carefully and recognize, using context, where substitutions/typographical errors may have occurred.   "

## 2024-04-04 ENCOUNTER — TELEPHONE (OUTPATIENT)
Age: 59
End: 2024-04-04

## 2024-04-04 NOTE — TELEPHONE ENCOUNTER
Caller: Patient     Doctor: Lachman    Reason for call: Please upload all FMLA ppwk to patient's MyChart. She is also stating that [facility] did not receive FMLA that was faxed on 3/27/24. Asking if it can faxed again. Thank you.    Fax#: [phone]

## 2024-04-04 NOTE — TELEPHONE ENCOUNTER
Caller: Patient     Doctor: Lachman    Reason for call: Patient has a post op appt on 4/10/24. Her foot is painful and swollen. She stated she can not bend her toes.    Call back#: [phone]

## 2024-04-05 NOTE — TELEPHONE ENCOUNTER
"She is in a cast and is nonweightbearing.      If the foot is swollen, she has been having it down too much.  Nonweightbearing in a cast, she wouldn't be overusing the foot, but she could have it in a dependent position too much.      She should rededicate herself to elevation and ice.  \"Toes above the nose\" as much as needed.        This surgery did not involve her toes. So the inability to move them is likely secondary to lack of her attempting to move them during the postop period.  As far as the toes, she should be constantly wriggling her toes in the cast. She may use her hand to help push the toes down and up to encourage motion."

## 2024-04-05 NOTE — TELEPHONE ENCOUNTER
Caller: Patient     Doctor: Lachman     Reason for call: Missed call, call was warm transferred to nurse

## 2024-04-05 NOTE — TELEPHONE ENCOUNTER
"Received callback from pt and relayed Dr Lachman msg in detail.  Pt states that she is back to work and keeps foot elevated parallel to the floor under her desk.  Foot and toes swell and she feels pain under arch of cast.  She does ice and tries to elevate above nose during lunch.  She works from home.  She takes Tylenol for pain and it only takes edge off so she is not sleeping well at night.  When she gets up in the morning, the cast is loose and swelling has gone down. She did fall last Saturday as scooter slipped from underneath her. She went to  and now has a splint on R wrist for ? Fx.    She was looking for any further advise.  She does have f/u appt on 4/10/24 to remove cast.  Advised to keep foot elevated \"toes above nose\" as much as possible over the weekend.  She states that she will.   "

## 2024-04-09 ENCOUNTER — TELEPHONE (OUTPATIENT)
Age: 59
End: 2024-04-09

## 2024-04-09 DIAGNOSIS — M25.541 PAIN IN JOINT OF RIGHT HAND: Primary | ICD-10-CM

## 2024-04-09 DIAGNOSIS — M89.8X9 BONE PAIN: ICD-10-CM

## 2024-04-09 DIAGNOSIS — S63.501A SPRAIN OF RIGHT WRIST, INITIAL ENCOUNTER: ICD-10-CM
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
## 2024-04-09 DIAGNOSIS — S63.641A SPRAIN OF METACARPOPHALANGEAL (MCP) JOINT OF RIGHT THUMB, INITIAL ENCOUNTER: ICD-10-CM

## 2024-04-09 NOTE — TELEPHONE ENCOUNTER
Called and let patient know that a Ct order was place in her chart patient will call today for appointment

## 2024-04-09 NOTE — TELEPHONE ENCOUNTER
Caller:Patient     Doctor: Willis    Reason for call:     Patient is calling about the pain on the right hand, she is experiencing pain of an 10 and   Swelling is now moving up the outside of her wrist and top of her hand.  She is asking for a call back on what the dr would advise until her appointment on 4/15/24..  Looking for a call back.    Call back#: [phone]

## 2024-04-09 NOTE — TELEPHONE ENCOUNTER
Caller: Patient    Doctor: Willis    Reason for call:     Patient is calling about her CT Scan for   M25.541 (ICD-10-CM) - Pain in joint of right hand   S63.641A (ICD-10-CM) - Sprain of metacarpophalangeal (MCP) joint of right thumb, initial encounter   W19.XXXA (ICD-10-CM) - Fall, initial encounter   S63.501A (ICD-10-CM) - Sprain of right wrist, initial encounter   M89.8X9 (ICD-10-CM) - Bone pain     Patient is calling to begin the processing for the prior auth for this scan on 4/13/24, please contact the patient once approved, she has an appointment with Dr Pedro on 4/15/24 to review the CT Scan.    Call back#: [phone]

## 2024-04-10 ENCOUNTER — APPOINTMENT (OUTPATIENT)
Dept: RADIOLOGY | Facility: AMBULARY SURGERY CENTER | Age: 59
End: 2024-04-10
Attending: ORTHOPAEDIC SURGERY
Payer: COMMERCIAL

## 2024-04-10 ENCOUNTER — OFFICE VISIT (OUTPATIENT)
Dept: OBGYN CLINIC | Facility: CLINIC | Age: 59
End: 2024-04-10

## 2024-04-10 VITALS
WEIGHT: 215 LBS | DIASTOLIC BLOOD PRESSURE: 78 MMHG | HEIGHT: 64 IN | SYSTOLIC BLOOD PRESSURE: 120 MMHG | HEART RATE: 84 BPM | BODY MASS INDEX: 36.7 KG/M2

## 2024-04-10 DIAGNOSIS — M76.822 POSTERIOR TIBIAL TENDON DYSFUNCTION (PTTD) OF LEFT LOWER EXTREMITY: ICD-10-CM

## 2024-04-10 DIAGNOSIS — Z01.89 ENCOUNTER FOR LOWER EXTREMITY COMPARISON IMAGING STUDY: ICD-10-CM

## 2024-04-10 DIAGNOSIS — Z01.89 ENCOUNTER FOR LOWER EXTREMITY COMPARISON IMAGING STUDY: Primary | ICD-10-CM

## 2024-04-10 PROCEDURE — 73630 X-RAY EXAM OF FOOT: CPT

## 2024-04-10 PROCEDURE — 99024 POSTOP FOLLOW-UP VISIT: CPT | Performed by: ORTHOPAEDIC SURGERY

## 2024-04-10 PROCEDURE — 73620 X-RAY EXAM OF FOOT: CPT

## 2024-04-10 PROCEDURE — 73600 X-RAY EXAM OF ANKLE: CPT

## 2024-04-10 NOTE — PROGRESS NOTES
James R Lachman, M.D.  Attending, Orthopaedic Surgery  Foot and Ankle  [facility]      ORTHOPAEDIC FOOT AND ANKLE POST-OP VISIT     Procedure:      Left FDL tendon transfer to the navicular, Cotton and medial calcaneal slide osteotomies and achilles lengthening       Date of surgery:   2/26/24      PLAN  1. Weightbearing Status - PWB operative extremity with progression to WBAT  2. DVT prophylaxis - Completed  3. Begin PT/HEP as directed  4. Pain control - OTC pain medication  5. RTC in 6 weeks  6. Xrays needed next visit - Yes flatfoot series     History of Present Illness:   Chief Complaint:   Chief Complaint   Patient presents with    Post-op     Post op 6 week check up.        Treasure Chung is a 59 y.o. female who is being seen for 6 week post-operative visit for the above procedure. Pain is well controlled and the patient has successfully transitioned to OTC pain medicines.  she is taking ASA 325mg BID for DVT prophylaxis. Patient has been NWB in a Short leg cast      Review of Systems:  General- denies fever/chills  Respiratory- denies cough or SOB  Cardio- denies chest pain or palpitations  GI- denies abdominal pain  Musculoskeletal- Negative except noted above  Skin- denies rashes or wounds    Physical Exam:   There were no vitals taken for this visit.  General/Constitutional: No apparent distress: well-nourished and well developed.  Eyes: normal ocular motion  Lymphatic: No appreciable lymphadenopathy  Respiratory: Non-labored breathing  Vascular: No edema, swelling or tenderness, except as noted in detailed exam.  Integumentary: No impressive skin lesions present, except as noted in detailed exam.  Neuro: No ataxia or tremors noted  Psych: Normal mood and affect, oriented to person, place and time. Appropriate affect.  Musculoskeletal: Normal, except as noted in detailed exam and in HPI.    Examination    left        Incision Clean, dry, intact  Sutures Previously removed.     Ecchymosis none    Swelling mild    Sensation Intact to light touch throughout sural, saphenous, superficial peroneal, deep peroneal and medial/lateral plantar nerve distributions.  Walton-Loren 5.07 filament (10g) testing deferred.    Cardiovascular Brisk capillary refill < 2 seconds,intact DP and PT pulses    Special Tests None      Imaging Studies:   5 views of the left ankle/foot were taken, reviewed and interpreted independently that demonstrate hardware in expected position without signs of failure or loosening. Reviewed by me personally.      Scribe Attestation      I,:  Sandor Carter am acting as a scribe while in the presence of the attending physician.:       I,:  James R Lachman, MD personally performed the services described in this documentation    as scribed in my presence.:                James R. Lachman, MD  Foot & Ankle Surgery   Department of Orthopaedic Surgery  [facility]      I personally performed the service.    James R. Lachman, MD

## 2024-04-10 NOTE — PATIENT INSTRUCTIONS
4 days of partial weight bearing 50%  Then, 4 days of partial weight bearing 75%  Then, 4 days of partial weight bearing 90%  Then full weight bearing in the boot and wean your crutches/rolling walker.    Then 2 weeks of weightbearing as tolerated in the CAM boot.    You may begin weaning your boot and transitioning to a sneaker (5/6). It is important to do this gradually to avoid aggravating the healing process.    5/6, you may come out of the boot into a sneaker for 2 hours.  2. 5/7, you may come out of the boot into a sneaker for 4 hours,  3. The next day, you may come out of the boot into a sneaker for 6 hours.  4. Continue this (adding 2 hours per day) as you tolerate. For example, if you do 6 hours out of the boot into a sneaker and your foot swells more than usual at night and it is difficult to control the discomfort, do not advance to 8 hours the next day, stay at 6 hours until you are able to tolerate it.    It is essential to follow this protocol and not modify this.  No matter when you begin weaning the boot, it will be difficult and there will be swelling and soreness.  If you spend more time than is recommended in the boot, this process becomes longer and more painful.    Elevation, Ice and tylenol and staying off of it at night will be important to aide in this transition out of the boot. Swelling and soreness are normal as you begin to do more with the injured leg.    May DC aspirin/lovenox, no longer needed.  May shower, do not soak in a tub/pool/ocean/etc for another 1 week.  Begin PT  Scar massage- pea sized amount of lotion, massage into scar for 5 minutes each day.  Compression stocking (Knee high, 20-30mm Hg) to be worn at all times while awake.  Recommend taking the following supplements: Vitamin D3-4000 units per day and Calcium 1200 mg per day. This will help with bone healing.

## 2024-04-11 ENCOUNTER — TELEPHONE (OUTPATIENT)
Age: 59
End: 2024-04-11

## 2024-04-11 NOTE — TELEPHONE ENCOUNTER
Caller: Patient    Doctor: Lachman    Reason for call: Patient wants to update doctor on situation.    Patient saw Dr Lachman yesterday and received an updated set of instructions for recovery.  Patient reports that she understands the instructions but due to her right wrist being in a splint as well, she is having some difficulty; reports she cannot use crutches.    Patient has been using walker, but due to wrist pain from coinciding injury, patient has some difficulty with this at times.  At recommendation of PT, patient has been using a scooter when absolutely necessary until they can assess her on Tuesday.    Patient just wanted to let provider know of this difficulty, and to see if this makes any changes to recovery plan.    Call back#: [phone]     - - -

## 2024-04-13 ENCOUNTER — HOSPITAL ENCOUNTER (OUTPATIENT)
Dept: CT IMAGING | Facility: HOSPITAL | Age: 59
Discharge: HOME | End: 2024-04-13
Attending: FAMILY MEDICINE
Payer: COMMERCIAL

## 2024-04-13 DIAGNOSIS — S63.501A SPRAIN OF RIGHT WRIST, INITIAL ENCOUNTER: ICD-10-CM

## 2024-04-13 DIAGNOSIS — M89.8X9 BONE PAIN: ICD-10-CM

## 2024-04-13 DIAGNOSIS — M25.541 PAIN IN JOINT OF RIGHT HAND: ICD-10-CM
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
## 2024-04-13 DIAGNOSIS — S63.641A SPRAIN OF METACARPOPHALANGEAL (MCP) JOINT OF RIGHT THUMB, INITIAL ENCOUNTER: ICD-10-CM

## 2024-04-13 PROCEDURE — 73200 CT UPPER EXTREMITY W/O DYE: CPT

## 2024-04-15 NOTE — RESULT ENCOUNTER NOTE
Reviewed results with patient.  Discussed ordering MRI arthrogram of the wrist.  Patient would like to defer at this time.  Will reconsider at next appointment in 4 days after clinical exam

## 2024-04-16 ENCOUNTER — EVALUATION (OUTPATIENT)
Dept: PHYSICAL THERAPY | Facility: REHABILITATION | Age: 59
End: 2024-04-16
Payer: COMMERCIAL

## 2024-04-16 DIAGNOSIS — G89.29 CHRONIC PAIN OF LEFT ANKLE: ICD-10-CM

## 2024-04-16 DIAGNOSIS — M25.572 CHRONIC PAIN OF LEFT ANKLE: ICD-10-CM

## 2024-04-16 DIAGNOSIS — M76.822 POSTERIOR TIBIAL TENDON DYSFUNCTION (PTTD) OF LEFT LOWER EXTREMITY: Primary | ICD-10-CM

## 2024-04-16 PROCEDURE — 97110 THERAPEUTIC EXERCISES: CPT

## 2024-04-16 PROCEDURE — 97161 PT EVAL LOW COMPLEX 20 MIN: CPT

## 2024-04-16 NOTE — PROGRESS NOTES
PT Evaluation     Today's date: 2024  Patient name: Treasure Chung  : 1965  MRN: [id number]  Referring provider: Lachman, James R, MD  Dx:   Encounter Diagnosis     ICD-10-CM    1. Chronic pain of left ankle  M25.572     G89.29       2. Posterior tibial tendon dysfunction (PTTD) of left lower extremity  M76.822 Ambulatory Referral to Physical Therapy          Start Time: 0730  Stop Time: 0815  Total time in clinic (min): 45 minutes    Assessment  Assessment details: Patient is a 59 y.o. female presenting to initial examination with chief complaint of L ankle pain and stiffness x7 week S/P Medial slide calcaneal osteotomy, Achilles lengthening, FDL tendon transfer to the navicular, Cotton osteotomy (surgical date 24). Signs and symptoms are consistent with referred diagnosis. Primary impairments include: Painful/restricted L ankle ROM, swelling of the foot and ankle, gross weakness of the L foot/ankle, balance deficits, inability to fully WB with post-surgical precautions, difficulties  w/ ambulation with AD due to pain + concurrent scaphoid fracture (rolling walker/knee scooter), and impaired biomechanics with functional transfers. As a result of impairments patient experiences limitations with functional/daily activities including those listed below. Educated patient regarding plan of care and answered all patient questions to patient satisfaction. Patient would benefit from skilled PT interventions to address above impairments, achieve goals, and to maximize function. Thank you for the referral.    Impairments: abnormal gait, abnormal muscle tone, abnormal or restricted ROM, activity intolerance, impaired balance, impaired physical strength, lacks appropriate home exercise program, pain with function, weight-bearing intolerance, poor posture  and poor body mechanics  Functional limitations: prolonged standing/walking, ambulation w/out AD in home/community, stair navigation, sleeping, caring for 2  dogs, taking photography for sports teams, performing household chores  Symptom irritability: moderateUnderstanding of Dx/Px/POC: good   Prognosis: good    Goals  Impairment Goals: 4-6 weeks  - Patient to decrease pain to 1-2/10  - Patient to improve knee AROM to equal to uninvolved side  - Patient to increase knee strength to 5/5 throughout  - Patient to increase hip strength to 5/5 throughout  - Patient to increase ankle strength to 5/5 throughout  - Patient will demonstrate reduction in foot/ankle swelling to equivalent to unaffected LE    Functional Goals: by discharge  - Patient to discharge to independent [facility]  - Patient to return to prior level of function  - Patient to ambulate in home and community (no AD) without increased pain or difficulty   - Patient to ascend and descend stairs without increased pain or difficulty  - Patient to complete sit to stand transfers without increased pain or difficulty  - Patient to be able to care for her two dogs without increased pain or difficulty  - 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 or difficulty      Plan  Patient would benefit from: skilled physical therapy  Referral necessary: No  Planned therapy interventions: joint mobilization, manual therapy, abdominal trunk stabilization, balance, balance/weight bearing training, body mechanics training, behavior modification, neuromuscular re-education, patient education, postural training, strengthening, stretching, therapeutic activities, therapeutic exercise, functional ROM exercises and home exercise program  Frequency: 1x week  Duration in visits: 4  Duration in weeks: 4  Plan of Care beginning date: 4/16/2024  Plan of Care expiration date: 5/17/2024  Treatment plan discussed with: patient        Subjective Evaluation    History of Present Illness  Date of surgery: 2/26/2024  Mechanism of injury: surgery  Mechanism of injury: Treasure reports to PT with complaints of L foot and  ankle pain/stiffness 7 Weeks S/P MCO, Achilles lengthening, FDL tendon transfer to the navicular, and Cotton osteotomy    She describes the following regarding her current symptoms and subsequent restrictions:    - after failing conservative treatments for the ankle pain she had been dealing with for years, she received flat foot surgery in February at the recommendation of her orthopedic physician   - had been wearing orthotics since she was 10 years old to address arch collapse  - has had bouts of plantar fasciitis, would always resolve itself, but she has had pain in the L foot since christmas eve this year and couldn't get rid of pain  - went to orthopedic at [facility], had diagnosis of posterior tibialis dysfunction, was scheduled for surgery 2/26  - states that they straightened heel out, lengthened the achilles tendon, and did some other procedures to help her arch  - reports pain in her heel as well as as along the inside of the ankle/over medial portion of the foot (more or less over the incisional area)  - takes extra strength tylenol only as needed, doesn't always take it  - Has been doing elevation and ice as well to address pain and swelling  - States cast taken off last Wednesday at 3-week post-op and swapped to CAM boot (finds it cumbersome and difficult to ambulate with, also impedes upon sleep)  - On progressive WB schedule currently and is @ (unsure what % exactly she really pushes through the foot - feels like 30%)   Presents today with rolling walker, Has knee scooter at home but recently fell will using it and endured a scaphoid fracture to the R wrist which she wears a brace for (this makes using the walker very difficult)  - Works from home currently and sits at desk all day but tries to get up every 2 hours to move around a bit, elevates the leg when she works  - reports no fevers/chills/sweats or encountering abnormal redness/swelling/heat/discharge (notes a scab that fell off posterior heel which  caused reduction in pain when in boot)    Reports the following restrictions:  - prolonged walking is painful and challenging, has a lot of difficulty from pain/energy perspective   - Stair Navigation (wants to sleep upstairs again, hasn't gone upstairs or down to basement)  - Taking care of 2 dogs  - performing household chores like hanging laundry (can't reach up and get up on toes)  - 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 and she has been unable to do so as a result of her injury/mobility restrictions (can't get onto the field well or stand to get good shots (or use R hand well)            Not a recurrent problem   Quality of life: good    Patient Goals  Patient goals for therapy: return to sport/leisure activities, increased strength, improved balance, decreased pain, decreased edema, increased motion, independence with ADLs/IADLs and return to work    Pain  Current pain ratin  At best pain ratin  At worst pain ratin  Quality: pressure, dull ache and burning  Relieving factors: rest, support, ice and medications  Aggravating factors: standing, walking, stair climbing and lifting  Progression: no change    Social Support  Steps to enter house: yes  Stairs in house: yes     Treatments  No previous or current treatments        Objective     Observations   Left Ankle/Foot   Positive for edema and incision.     Additional Observation Details  Incisions along dorsum of foot as well as posterior heel  No abnormal redness/heat/swelling/discharge, area clean and well maintained. No obvious signs of infection  Noted dry blood over posterior heel incisional region but minimal      Palpation   Left   No palpable tenderness to the anterior tibialis.   Hypertonic in the lateral gastrocnemius and medial gastrocnemius.   Tenderness of the lateral gastrocnemius, medial gastrocnemius, peroneus and posterior tibialis.     Tenderness   Left Ankle/Foot   Tenderness in the Achilles insertion, anterior  ankle, dorsum foot, medial calcaneus, navicular and proximal Achilles. No tenderness in the fifth metatarsal base, lateral malleolus, medial malleolus and plantar fascia.     Active Range of Motion   Left Knee   Normal active range of motion    Right Knee   Normal active range of motion  Left Ankle/Foot   Dorsiflexion (ke): 3 degrees with pain  Plantar flexion: 5 degrees with pain  Inversion: 3 degrees with pain  Eversion: 10 degrees with pain    Right Ankle/Foot   Dorsiflexion (ke): 10 degrees   Plantar flexion: 55 degrees   Inversion: 40 degrees   Eversion: 35 degrees     Passive Range of Motion     Additional Passive Range of Motion Details  Plan to assess at time of NV    Strength/Myotome Testing     Left Knee   Flexion: 4  Extension: 4  Quadriceps contraction: good    Right Knee   Flexion: 4+  Extension: 4+  Quadriceps contraction: good    Right Ankle/Foot   Dorsiflexion: 5  Plantar flexion: 5  Inversion: 5  Eversion: 5    Additional Strength Details  Plan to assess at time of NV  Strength testing of L ankle deferred due to post surgical status and WB restrictions. Will assess at later visit    Swelling   Left Ankle/Foot   Metatarsal heads: 23 cm  Malleoli: 25 cm    Right Ankle/Foot   Metatarsal heads: 21 cm  Malleoli: 22 cm    Ambulation   Weight-Bearing Status   Weight-Bearing Status (Left): weight-bearing as tolerated   On progressive WB schedule (@30%)  Assistive device used: two-wheeled walker    Additional Weight-Bearing Status Details  Patient currently ambulates with two-wheeled walker with enlarged front wheels to accommodate for grassy terrain patient needs to traverse and slides on rear legs    Patient ambulates with antalgic gait pattern with step-to progression and bent knee posturing to accommodate for pain    Note: Presence of scaphoid fracture of R hand, currently wearing brace for this.  Pain in the wrist complicates ambulation with walker but patient is afraid to use knee scooter after recent  fall resulting in her fracture. Accomodates for pain with padded hand cushion for  and states this has been helping)      Functional Assessment      Squat    Unable to perform , pain, left valgus, left tibial anterior translation beyond toes, trunk lean right, right valgus and right tibial anterior translation beyond toes.     Comments  Unable to assess balance today due to pain and post-op WB restrictions.  Will be assessed at later visit             Precautions: N/A      Manuals 4/16            Hip Strength/ROM Eval PLAN                                                   Neuro Re-Ed             Gentle Toe Scrunches HEP            LAQ Eccentric Lower             Hip ABD Standing             Hip EXT Standing                                                    Ther Ex             Gentle Ankle ABCs HEP            Gentle Ankle Pumps HEP            Weight Shifts ML/FWD BCK             Hamstring Stretch                                                                 Ther Activity                                       Gait Training                                       Modalities

## 2024-04-19 ENCOUNTER — OFFICE VISIT (OUTPATIENT)
Dept: OBGYN CLINIC | Facility: CLINIC | Age: 59
End: 2024-04-19
Payer: COMMERCIAL

## 2024-04-19 VITALS — WEIGHT: 215 LBS | BODY MASS INDEX: 36.7 KG/M2 | HEIGHT: 64 IN

## 2024-04-19 DIAGNOSIS — M25.541 PAIN IN JOINT OF RIGHT HAND: Primary | ICD-10-CM
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
## 2024-04-19 DIAGNOSIS — S63.641D SPRAIN OF METACARPOPHALANGEAL (MCP) JOINT OF RIGHT THUMB, SUBSEQUENT ENCOUNTER: ICD-10-CM

## 2024-04-19 DIAGNOSIS — S63.501D SPRAIN OF RIGHT WRIST, SUBSEQUENT ENCOUNTER: ICD-10-CM

## 2024-04-19 PROBLEM — S63.501A SPRAIN OF RIGHT WRIST, INITIAL ENCOUNTER: Status: ACTIVE | Noted: 2024-04-19

## 2024-04-19 PROBLEM — S63.641A SPRAIN OF METACARPOPHALANGEAL (MCP) JOINT OF RIGHT THUMB, INITIAL ENCOUNTER: Status: ACTIVE | Noted: 2024-04-19

## 2024-04-19 PROCEDURE — 99214 OFFICE O/P EST MOD 30 MIN: CPT | Performed by: FAMILY MEDICINE

## 2024-04-19 NOTE — PATIENT INSTRUCTIONS
Heat Pack Application   WHAT YOU NEED TO KNOW:   Why is a heat pack helpful?  Heat increases blood flow to an area to promote healing after an injury or surgery. Heat also helps decrease pain.   How do I apply a heat pack?  You can apply heat with an electric heating pad, hot water bottle, or warm compress. Heat should be put applied for about 20 to 30 minutes or as long and as often as directed. Always put a cloth between your skin and the heat pack to prevent burns. Check your skin for color changes or blisters about every 5 minutes. Remove the heat if you notice skin changes.   What should I know about an electric heating pad?  Make sure the cord is not broken open in any areas before you plug it in. Once plugged in, set to the heat setting you were told to use. Cover the heating pad to protect your skin.  How do I prepare a hot water bottle?  Fill the hot water bottle about half full so it does not get too heavy. Remove air from the bottle by squeezing it until you see water at the opening. Cap the bottle and wrap a cloth around it before placing on your skin.  How do I prepare a warm compress?  Fill a sink with hot water that is 131°F. Wet a towel in the water and squeeze out extra water. Place the towel inside a waterproof cover before placing on your skin.   When should I contact my healthcare provider?   You see blisters, whitening of your skin, or more swelling after you use heat.    You have redness that does not improve after you use heat.     You have questions about the use of heat packs.    CARE AGREEMENT:   You have the right to help plan your care. Learn about your health condition and how it may be treated. Discuss treatment options with your healthcare providers to decide what care you want to receive. You always have the right to refuse treatment. The above information is an  only. It is not intended as medical advice for individual conditions or treatments. Talk to your doctor,  nurse or pharmacist before following any medical regimen to see if it is safe and effective for you.  © Copyright [employer] 2023 Information is for End User's use only and may not be sold, redistributed or otherwise used for commercial purposes.  P.R.I.C.E. Treatment   WHAT YOU NEED TO KNOW:   What is P.R.I.C.E. treatment?  P.R.I.C.E. treatment is a 5-step process used to decrease swelling and pain caused by an injury. P.R.I.C.E. stands for protect, rest, ice, compress, and elevate. Start P.R.I.C.E. within 24 to 48 hours of an injury.  How do I use P.R.I.C.E. treatment?       Protect  your injury from more damage. Support the injured area with a brace or splint. Your healthcare provider will tell you how long to use the brace or splint.    Rest  your injured area as directed. You may need to stop using, or keep weight off, the injury for 48 hours or longer. Your healthcare provider may recommend crutches or another device. Return to your usual activities as directed.    Apply ice  on your injured area for 15 to 20 minutes every 4 hours or as directed. Use an ice pack, or put crushed ice in a plastic bag. Cover the bag with a towel before you apply it to your skin. Ice helps prevent tissue damage and decreases swelling and pain.    Compress  (keep pressure on) the injured area. Compression will help decrease swelling and support the injured area. Use an elastic bandage, air stirrup, splint, or sling as directed. If you use an elastic bandage, make sure the bandage is not too tight. You should be able to slip 2 fingers between the bandage and your skin.    Elevate  the injured area above the level of your heart as often as you can. This will help decrease swelling and pain. Prop the injured area on pillows or blankets to keep it elevated comfortably.  When should I seek immediate care?   Your pain is severe.    You have severe swelling or deformity.    You have numbness in the injured area.    When should I call my  doctor?   Your pain and swelling do not go away after a few days.    You have questions or concerns about your condition or care.    CARE AGREEMENT:   You have the right to help plan your care. Learn about your health condition and how it may be treated. Discuss treatment options with your healthcare providers to decide what care you want to receive. You always have the right to refuse treatment. The above information is an  only. It is not intended as medical advice for individual conditions or treatments. Talk to your doctor, nurse or pharmacist before following any medical regimen to see if it is safe and effective for you.  © Copyright [employer] 2023 Information is for End User's use only and may not be sold, redistributed or otherwise used for commercial purposes.

## 2024-04-19 NOTE — PROGRESS NOTES
Assessment:     1. Pain in joint of right hand  Ambulatory Referral to PT/OT Hand Therapy    Brace    CANCELED: Brace      2. Sprain of right wrist, subsequent encounter  Ambulatory Referral to PT/OT Hand Therapy    Brace    CANCELED: Brace      3. Sprain of metacarpophalangeal (MCP) joint of right thumb, subsequent encounter  Ambulatory Referral to PT/OT Hand Therapy    Brace    CANCELED: Brace      4. Fall, subsequent encounter  Ambulatory Referral to PT/OT Hand Therapy    Brace    CANCELED: Brace        Orders Placed This Encounter   Procedures    Brace    Ambulatory Referral to PT/OT Hand Therapy        Impression:   Right hand pain likely secondary to Thumb sprain and tendinopathy/discomfort within the dorsal first compartment  Right wrist pain likely secondary to wrist sprain.  Date of injury: 03/30/2024.  Mechanism of injury: Fall downstairs  Follow-up from injury: 2 weeks and 6 days     Conservative Management   We discussed different treatment options:  Reviewed care now documentation completed on 3/30/2024.  Treatment plan consisted of x-rays and referral to Ortho/sports med  Ice or Heat Therapy as needed 1-2 times daily for 10-20 minutes. As tolerated.   Over the counter Tylenol and/or NSAIDs  as needed based off your Past Medical Hx. Please follow product label for dosing and maximum limits.    Will transition from thumb spica brace to cool comfort.  Prescription provided for PT /OT  Reviewed CT scan results.  Which recommended potential MRI arthrogram of the wrist.  At this time clinical examination not congruent with concerns for ligamentous rupture.  Patient would like to trial formal physical therapy prior to potential advanced imaging.           Imaging   Reviewed prior xrays obtain in Urgent or Emergency Department. These were reviewed in office with patient today.   03/30/2024 right wrist x-ray: No acute osseous abnormality  03/30/2024 right hand x-ray: No acute osseous abnormality  04/13/2024 CT  scan right upper extremity:  IMPRESSION:     No acute fractures. Specifically the scaphoid is intact.     There is widening of the scapholunate interval to 5 mm, suspicious for scapholunate ligament injury (series 5 images 18-25.) MR arthrogram of the wrist would be helpful to further evaluate.  Will consider MRI arthrogram     Procedure  Not appropriate at this time.     Shared decision making, patient agreeable to plan.      Return for Follow up after 4 wks.    HPI:   Treasure Chung is a 59 y.o. female  who presents for evaluation of   Chief Complaint   Patient presents with    Right Hand - Numbness, Pain, Swelling       Today's Visit   Denies any new injury   Location: distal ulna, distal radius dorsal base of the third fourth fifth metacarpal  Severity: Current severity: 4/10.   Pain described as:sharp.  Provocative: most motion.  Associated symptoms: swelling improved. .    Previous Visit 04/01/2024   Occupation: Seated/computer positions  Injury Related: Yes, Date of Injury: 2 days     Onset/Mechanism: Patient was utilizing knee scooter for lower left extremity cast.  When she had a mechanical fall.  Bracing with her right hand.  Thumb pain instantly.  Location: First second and third carpal and phalanges.  Severity: Current severity: 6-7/10.  Pain described as: Ache, sharp   Radiation: Will radiate up into the mid forearm.  Does not radiate up into the elbow.  Provocative: All hand motions..  Associated symptoms: Swelling and bruising..     Denies any hx of fracture of affected limb.   History for surgery, 8 to 10 years ago  Hx surgery for lipoma removal, during high school.     Summary of treatment to-date:   Rest   Brace  Ice therpay    Following History Reviewed and Updated     Past Medical History:   Diagnosis Date    BCC (basal cell carcinoma of skin)     Hyperlipidemia     Hypertension     PONV (postoperative nausea and vomiting) 2/25/2024     Past Surgical History:   Procedure Laterality Date    BASAL  CELL CARCINOMA EXCISION Left     nose    BELPHAROPTOSIS REPAIR      HAND SURGERY      HYSTERECTOMY      MOHS RECONSTRUCTION N/A 02/24/2021    Procedure: REPAIR MOH'S DEFECT OF NOSE;  Surgeon: Rolf Vazquez MD;  Location:  MAIN OR;  Service: Plastics    GA OSTEOTOMY CALCANEUS W/WO INTERNAL FIXATION Left 2/26/2024    Procedure: Medial slide calcaneal osteotomy, Achilles lengthening, FDL tendon transfer to the navicular, Cotton osteotomy;  Surgeon: James R Lachman, MD;  Location:  MAIN OR;  Service: Orthopedics    TUBAL LIGATION      WISDOM TOOTH EXTRACTION      WRIST SURGERY       Family History   Problem Relation Age of Onset    Hypertension Mother     Diabetes Mother     Heart failure Mother     Hyperlipidemia Mother     Hyperlipidemia Father     Stroke Father     Hypertension Father     Diabetes Father     Basal cell carcinoma Father     Hyperlipidemia Sister     Hypertension Sister     Diabetes Sister     Kidney failure Sister     Hyperlipidemia Sister     Hyperlipidemia Brother     Hypertension Brother     Diabetes Brother     Cancer Brother     Hyperlipidemia Brother     Heart disease Brother     Hypertension Brother     Stroke Maternal Aunt     Heart disease Maternal Grandmother     Stroke Maternal Grandmother     Diabetes Maternal Grandfather     Diabetes Paternal Grandmother     Ovarian cancer Paternal Grandmother     Diabetes Paternal Grandfather     Breast cancer Cousin     Heart disease Other     Leukemia Other     Thyroid disease Neg Hx        Social History     Substance and Sexual Activity   Alcohol Use Yes    Alcohol/week: 1.0 - 2.0 standard drink of alcohol    Types: 1 - 2 Standard drinks or equivalent per week    Comment: occasional     Social History     Substance and Sexual Activity   Drug Use Never     Social History     Tobacco Use   Smoking Status Former    Current packs/day: 0.00    Average packs/day: 1 pack/day for 19.0 years (19.0 ttl pk-yrs)    Types: Cigarettes    Start date: 1983     Quit date: 2002    Years since quittin.3   Smokeless Tobacco Never       Social Determinants of Health     Tobacco Use: Medium Risk (2024)    Patient History     Smoking Tobacco Use: Former     Smokeless Tobacco Use: Never     Passive Exposure: Not on file   Alcohol Use: Not At Risk (2023)    Received from [facility]    AUDIT-C     Frequency of Alcohol Consumption: Monthly or less     Average Number of Drinks: 1 or 2     Frequency of Binge Drinking: Never   Financial Resource Strain: Low Risk  (8/10/2023)    Received from [facility]    Overall Financial Resource Strain (CARDIA)     Difficulty of Paying Living Expenses: Not hard at all   Food Insecurity: No Food Insecurity (8/10/2023)    Received from [facility]    Hunger Vital Sign     Worried About Running Out of Food in the Last Year: Never true     Ran Out of Food in the Last Year: Never true   Transportation Needs: No Transportation Needs (8/10/2023)    Received from [facility]    PRAPARE - Transportation     Lack of Transportation (Medical): No     Lack of Transportation (Non-Medical): No   Physical Activity: Not on file   Stress: No Stress Concern Present (2023)    Received from [facility]    [place] of [employer] - Occupational Stress Questionnaire     Feeling of Stress : Only a little   Social Connections: Moderately Integrated (2023)    Received from [facility]    Social Connection and Isolation Panel [NHANES]     Frequency of Communication with Friends and Family: More than three times a week     Frequency of Social Gatherings with Friends and Family: Once a week     Attends Mosque Services: 1 to 4 times per year     Active Member of Clubs or Organizations: No     Attends Club or Organization Meetings: Never     Marital Status:    Intimate Partner Violence: Not At Risk (8/10/2023)    Received from  "[facility]    Humiliation, Afraid, Rape, and Kick questionnaire     Fear of Current or Ex-Partner: No     Emotionally Abused: No     Physically Abused: No     Sexually Abused: No   Depression: Not at risk (2/7/2024)    Received from [facility]    PHQ-2     PHQ-2 Score: 0   Housing Stability: Low Risk  (8/10/2023)    Received from [facility]    Housing Stability Vital Sign     Unable to Pay for Housing in the Last Year: No     Number of Places Lived in the Last Year: 1     Unstable Housing in the Last Year: No   Utilities: Not on file   Health Literacy: Not on file        Allergies   Allergen Reactions    Ciprofloxacin Rash    Medical Tape Itching, Rash and Blisters     Paper tape tolerated    Other Allergic Rhinitis     Nasal symptoms       Review of Systems      Review of Systems     Review of Systems   Constitutional: Negative for chills and fever.   HENT: Negative for drooling and sneezing.    Eyes: Negative for redness.   Respiratory: Negative for cough and wheezing.    Gastrointestinal: Negative for vomiting.   Psychiatric/Behavioral: Negative for behavioral problems. The patient is not nervous/anxious.      All other systems negative.   Physical Exam   Physical Exam    Vitals and nursing note reviewed.  Constitutional:   Appearance. Normal Appearance.  Ht 5' 4\" (1.626 m)   Wt 97.5 kg (215 lb)   BMI 36.90 kg/m²     Body mass index is 36.9 kg/m².   HENT:  Head: Atraumatic.  Nose: Nose normal  Eyes: Conjunctiva/sclera: Conjunctivae normal.  Cardiovascular:   Rate and Rhythm: Bilateral equal distal pulses  Pulmonary:   Effort: Pulmonary effort is normal  Skin:   General: Skin is warm and dry.  Neurological:   General: No focal deficit present.  Mental Status: Alert and oriented to person, place, and time.   Psychiatric:   Mood and Affect: mood normal.  Behavior: Behavior normal     Musculoskeletal Exam     Ortho Exam     Right WRIST/ HAND:   " "  INSPECTION:  Erythema Swelling Ecchymosis Increased Warmth Adduction contracture of thumb Hyperextension deformity of MCP joint   Neg. resolved. resolved Neg. No No      PALPATION:   Crepitus   Neg.      TENDERNESS:  Distal Phalanx 1-5 PIP Joint 1-5 MCP Joint 1-5 Snuffbox Scaphoid CMC joint  (Radial and dorsal aspect of thumb) Volar surface of the ulnar had and pisiform   Neg. Neg. 1st MCP with tenderness, as well as 2nd and 3rd   Pos.. Pos.             ACTIVE ROM:  Opposition Malrotation Varus stress Valgus stress DRUJ   intact Neg.   (thumb perpendicular to remainig phalanges on adduction) No laxity or pain of thumb, discomfort present No laxity or pain of thumb, discomfort present Stable      STRENGTH:  Proximal Phalanx Strength (ulnar n.): (1-3 phalanx) Distal Phalanx Strength (median n.)  (1-3 phalanx) Finger Abduction strength Thumb Extension strength  Thumb Adduction strength (ulnar n): OK sign Froment Sign:   5/5 flexion, extension 5/5 flexion, extension 4/5 discomfort  4/5, discomfort   intact Able to form full [place] without triangle           SPECIAL TEST:  Axial load compression Metacarpal load shift test: A1 pulley Finkelstein's maneuver   Discomfort       unable to perform due to discomfort         Neurovascular:  Sensation to light touch Radial artery   Intact and equal bilaterally Intact and equal bilaterally      (Test not completed if space left blank )         Procedures       Portions of the record may have been created with voice recognition software. Occasional wrong word or \"sound alike\" substitutions may have occurred due to the inherent limitations of voice recognition software. Please review the chart carefully and recognize, using context, where substitutions/typographical errors may have occurred.   "

## 2024-04-23 ENCOUNTER — APPOINTMENT (OUTPATIENT)
Dept: PHYSICAL THERAPY | Facility: REHABILITATION | Age: 59
End: 2024-04-23
Payer: COMMERCIAL

## 2024-04-23 ENCOUNTER — TELEPHONE (OUTPATIENT)
Dept: OBGYN CLINIC | Facility: CLINIC | Age: 59
End: 2024-04-23

## 2024-05-01 ENCOUNTER — APPOINTMENT (OUTPATIENT)
Dept: PHYSICAL THERAPY | Facility: REHABILITATION | Age: 59
End: 2024-05-01
Payer: COMMERCIAL

## 2024-05-07 ENCOUNTER — APPOINTMENT (OUTPATIENT)
Dept: PHYSICAL THERAPY | Facility: REHABILITATION | Age: 59
End: 2024-05-07
Payer: COMMERCIAL

## 2024-05-08 ENCOUNTER — TELEPHONE (OUTPATIENT)
Age: 59
End: 2024-05-08

## 2024-05-08 NOTE — TELEPHONE ENCOUNTER
Called patient and talked with her about which day and time would work for her to make an appt she said none. Work is giving her a hard time with getting off and leaving early. Will talk with provider about using a 3:15 spot her to come in.

## 2024-05-08 NOTE — TELEPHONE ENCOUNTER
Caller: Treasure    Doctor: Lachman     Reason for call: Scheduled for a 2nd po appt on 5/22 however her job will not let her take anymore time off, due to being out to much, she is not sure if she  an make this appt, and is unable to get a 3 pm until 6/11. She is keeping the appt for 5/22 but wanted to see if it would be ok to wait until June for that later appt time    Call back#: [phone]

## 2024-05-09 ENCOUNTER — OFFICE VISIT (OUTPATIENT)
Dept: PHYSICAL THERAPY | Facility: REHABILITATION | Age: 59
End: 2024-05-09
Payer: COMMERCIAL

## 2024-05-09 DIAGNOSIS — G89.29 CHRONIC PAIN OF LEFT ANKLE: ICD-10-CM

## 2024-05-09 DIAGNOSIS — M25.572 CHRONIC PAIN OF LEFT ANKLE: ICD-10-CM

## 2024-05-09 DIAGNOSIS — M76.822 POSTERIOR TIBIAL TENDON DYSFUNCTION (PTTD) OF LEFT LOWER EXTREMITY: Primary | ICD-10-CM

## 2024-05-09 PROCEDURE — 97110 THERAPEUTIC EXERCISES: CPT

## 2024-05-09 PROCEDURE — 97112 NEUROMUSCULAR REEDUCATION: CPT

## 2024-05-09 PROCEDURE — 97140 MANUAL THERAPY 1/> REGIONS: CPT

## 2024-05-09 NOTE — PROGRESS NOTES
Daily Note     Today's date: 2024  Patient name: Treasure Chung  : 1965  MRN: [id number]  Referring provider: Lachman, James R, MD  Dx:   Encounter Diagnosis     ICD-10-CM    1. Posterior tibial tendon dysfunction (PTTD) of left lower extremity  M76.822       2. Chronic pain of left ankle  M25.572     G89.29           Start Time: 0500  Stop Time: 0545  Total time in clinic (min): 45 minutes    Subjective: Treasure reports that things have been going a lot better.  Has started walking more in general in the boot and has started transition to the supportive sneaker but this has been challenging.  Monday she began to try weaning to sneaker for 2 hours a day, after about an hour pain was and swelling was notable.  Tried it again the next day and was able to do the full 2 hours.  Today pain is about a 3/10 and tends to increase the longer she's on her feet (30 minute threshold prior to pain increasing)      Objective: See treatment diary below      Assessment: Tolerated treatment well. Patient demonstrated fatigue post treatment, exhibited good technique with therapeutic exercises, and would benefit from continued PT. Capable of initiating ankle AROM as well as hip strengthening today.  Hip strength assessment revealed weakness in the B/L hip ABD/EXT which was address with the addition of SL hip ABD intervention for HEP.  Provided gentle self stretches for ankle inversion/eversion for home as well as progressing ankle ABC to ankle 4 way AROM.  Plan to progress at time of NV      Plan: Continue per plan of care.      Precautions: N/A      Manuals  5/9           Hip Strength/ROM Eval PLAN MT           Ankle PROM  10min                                     Neuro Re-Ed             Gentle Toe Scrunches HEP 2min (Towel Scrunches)           LAQ Eccentric Lower             SL Hip ABD  2x10ea           Hip EXT Standing             Ankle AROM 4 -Way + PT Overpressure  2x10 ea (focus on eccentric release from end range    "                                  Ther Ex             Gentle Ankle ABCs HEP            Gentle Ankle Pumps HEP            Weight Shifts ML/FWD BCK  2x10           Hamstring Stretch             Gentle Ankle INV/EV/PF Stretching  10x10\"ea                                                  Ther Activity                                       Gait Training                                       Modalities                                            "

## 2024-05-13 ENCOUNTER — APPOINTMENT (OUTPATIENT)
Dept: PHYSICAL THERAPY | Facility: REHABILITATION | Age: 59
End: 2024-05-13
Payer: COMMERCIAL

## 2024-05-14 ENCOUNTER — APPOINTMENT (OUTPATIENT)
Dept: PHYSICAL THERAPY | Facility: REHABILITATION | Age: 59
End: 2024-05-14
Payer: COMMERCIAL

## 2024-05-23 ENCOUNTER — PATIENT MESSAGE (OUTPATIENT)
Dept: OBGYN CLINIC | Facility: CLINIC | Age: 59
End: 2024-05-23

## 2024-05-29 NOTE — PATIENT COMMUNICATION
Called patient to reschedule appointment on 6/5. Relayed Dr. Zeng's message from previous note. Patient is asking if there are any OTC creams/ointments she can use to reduce the inflammation/swelling. Her knuckle is not warm to the touch. She is also asking if she should start wearing the spica brace again. Please advise, thank you.

## 2024-05-30 ENCOUNTER — TELEPHONE (OUTPATIENT)
Age: 59
End: 2024-05-30

## 2024-05-30 NOTE — TELEPHONE ENCOUNTER
Caller: Self    Doctor: Lachman    Reason for call: Patient reports increased swelling whenever her feet are on the ground. States as long as her legs are elevated, there is no swelling. Has been wearing sneakers and compression stockings- neither are helping. She has not been able to go to physical therapy for a few weeks due to schedule but has been doing the exercises at  home. She feels her range of motion is progressing well. Wants to know if that amount of swelling is normal at this point and what she can do about it or wear on her feet when it does happen? Please advise    SX 2/26/24  Next appt 6/19/24    Call back#: [phone]

## 2024-06-25 ENCOUNTER — APPOINTMENT (OUTPATIENT)
Dept: RADIOLOGY | Facility: AMBULARY SURGERY CENTER | Age: 59
End: 2024-06-25
Attending: ORTHOPAEDIC SURGERY
Payer: COMMERCIAL

## 2024-06-25 ENCOUNTER — OFFICE VISIT (OUTPATIENT)
Dept: OBGYN CLINIC | Facility: CLINIC | Age: 59
End: 2024-06-25
Payer: COMMERCIAL

## 2024-06-25 VITALS — WEIGHT: 225 LBS | BODY MASS INDEX: 37.49 KG/M2 | HEIGHT: 65 IN

## 2024-06-25 DIAGNOSIS — Z01.89 ENCOUNTER FOR LOWER EXTREMITY COMPARISON IMAGING STUDY: ICD-10-CM

## 2024-06-25 DIAGNOSIS — M76.822 POSTERIOR TIBIAL TENDON DYSFUNCTION (PTTD) OF LEFT LOWER EXTREMITY: ICD-10-CM

## 2024-06-25 DIAGNOSIS — M76.822 POSTERIOR TIBIAL TENDON DYSFUNCTION (PTTD) OF LEFT LOWER EXTREMITY: Primary | ICD-10-CM

## 2024-06-25 PROCEDURE — 73600 X-RAY EXAM OF ANKLE: CPT

## 2024-06-25 PROCEDURE — 99213 OFFICE O/P EST LOW 20 MIN: CPT | Performed by: ORTHOPAEDIC SURGERY

## 2024-06-25 PROCEDURE — 73630 X-RAY EXAM OF FOOT: CPT

## 2024-06-25 PROCEDURE — 73620 X-RAY EXAM OF FOOT: CPT

## 2024-06-25 NOTE — PROGRESS NOTES
James R Lachman, M.D.  Attending, Orthopaedic Surgery  Foot and Ankle  [facility]      ORTHOPAEDIC FOOT AND ANKLE CLINIC VISIT     Assessment:     Encounter Diagnoses   Name Primary?    Posterior tibial tendon dysfunction (PTTD) of left lower extremity Yes    Encounter for lower extremity comparison imaging study        Plan:   The patient verbalized understanding of exam findings and treatment plan. We engaged in the shared decision-making process and treatment options were discussed at length with the patient. Surgical and conservative management discussed today along with risks and benefits.  4 months status post Left FDL tendon transfer to the navicular, Cotton and medial calcaneal slide osteotomies and achilles lengthening - 2/26/24  WBAT LLE   Continue home exercises   Pain control   Xrays at next visit - flatfoot series   Return in about 3 months (around 9/25/2024).      History of Present Illness:   Chief Complaint:   Chief Complaint   Patient presents with    Post-op     Post op 4 month check up.      Treasure Chung is a 59 y.o. female who is being seen in follow-up for 4 month postop visit following Left FDL tendon transfer to the navicular, Cotton and medial calcaneal slide osteotomies and achilles lengthening . When we last saw she we recommended to continue HEP.  Pain has not improved. Residual pain is localized at medial hindfoot and midfoot with minimal radiating and described as sharp and severe.      Pain/symptom timing:  Worse during the day when active  Pain/symptom context:  Worse with activites and work  Pain/symptom modifying factors:  Rest makes better, activities make worse  Pain/symptom associated signs/symptoms: none    Prior treatment   NSAIDsYes   Injections Yes   Bracing/Orthotics Yes    Physical Therapy Yes     Orthopedic Surgical History:   See below     Past Medical, Surgical and Social History:  Past Medical History:  has a past medical history of BCC  (basal cell carcinoma of skin), Hyperlipidemia, Hypertension, and PONV (postoperative nausea and vomiting) (2/25/2024).  Problem List: does not have any pertinent problems on file.  Past Surgical History:  has a past surgical history that includes Tubal ligation; Hysterectomy; Hand surgery; [place] tooth extraction; Blepharoptosis repair; Wrist surgery; MOHS RECONSTRUCTION (N/A, 02/24/2021); Excision basal cell carcinoma (Left); and pr osteotomy calcaneus w/wo internal fixation (Left, 2/26/2024).  Family History: family history includes Basal cell carcinoma in her father; Breast cancer in her cousin; Cancer in her brother; Diabetes in her brother, father, maternal grandfather, mother, paternal grandfather, paternal grandmother, and sister; Heart disease in her brother, maternal grandmother, and other; Heart failure in her mother; Hyperlipidemia in her brother, brother, father, mother, sister, and sister; Hypertension in her brother, brother, father, mother, and sister; Kidney failure in her sister; Leukemia in her other; Ovarian cancer in her paternal grandmother; Stroke in her father, maternal aunt, and maternal grandmother.  Social History:  reports that she quit smoking about 22 years ago. Her smoking use included cigarettes. She started smoking about 41 years ago. She has a 19 pack-year smoking history. She has never used smokeless tobacco. She reports current alcohol use of about 1.0 - 2.0 standard drink of alcohol per week. She reports that she does not use drugs.  Current Medications: has a current medication list which includes the following prescription(s): bupropion, calcium carbonate, cholecalciferol, lisinopril-hydrochlorothiazide, trazodone, aspirin, ondansetron, and oxycodone.  Allergies: is allergic to ciprofloxacin, medical tape, and other.     Review of Systems:  General- denies fever/chills  HEENT- denies hearing loss or sore throat  Eyes- denies eye pain or visual disturbances, denies red  "eyes  Respiratory- denies cough or SOB  Cardio- denies chest pain or palpitations  GI- denies abdominal pain  Endocrine- denies urinary frequency  Urinary- denies pain with urination  Musculoskeletal- Negative except noted above  Skin- denies rashes or wounds  Neurological- denies dizziness or headache  Psychiatric- denies anxiety or difficulty concentrating    Physical Exam:   Ht 5' 5\" (1.651 m)   Wt 102 kg (225 lb)   BMI 37.44 kg/m²   General/Constitutional: No apparent distress: well-nourished and well developed.  Eyes: normal ocular motion  Lymphatic: No appreciable lymphadenopathy  Respiratory: Non-labored breathing  Vascular: No edema, swelling or tenderness, except as noted in detailed exam.  Integumentary: No impressive skin lesions present, except as noted in detailed exam.  Neuro: No ataxia or tremors noted  Psych: Normal mood and affect, oriented to person, place and time. Appropriate affect.  Musculoskeletal: Normal, except as noted in detailed exam and in HPI.    Examination    Left    Gait Normal   Musculoskeletal Tender to palpation at medial midfoot/hindfoot    Skin Normal. Well-healed incisions.    Nails Normal    Range of Motion  10 degrees dorsiflexion, 20 degrees plantarflexion  Subtalar motion: normal     Stability Stable    Muscle Strength 5/5 tibialis anterior  5/5 gastrocnemius-soleus  5/5 posterior tibialis  5/5 peroneal/eversion strength  5/5 EHL  5/5 FHL    Neurologic Normal    Sensation  Intact to light touch throughout sural, saphenous, superficial peroneal, deep peroneal and medial/lateral plantar nerve distributions.  [place]-Loren 5.07 filament (10g) testing deferred.    Cardiovascular Brisk capillary refill < 2 seconds,intact DP and PT pulses    Special Tests None      Imaging Studies:   3 views of the Left foot were obtained, reviewed and interpreted independently which demonstrate intact hardware with healed osteotomies. Reviewed by me personally.      James R. Lachman, " MD  Foot & Ankle Surgery   Department of Orthopaedic Surgery  [facility]      I personally performed the service.    James R. Lachman, MD

## 2024-09-19 ENCOUNTER — OFFICE VISIT (OUTPATIENT)
Dept: PODIATRY | Facility: CLINIC | Age: 59
End: 2024-09-19
Payer: COMMERCIAL

## 2024-09-19 VITALS
HEIGHT: 65 IN | DIASTOLIC BLOOD PRESSURE: 78 MMHG | BODY MASS INDEX: 37.32 KG/M2 | WEIGHT: 224 LBS | SYSTOLIC BLOOD PRESSURE: 124 MMHG

## 2024-09-19 DIAGNOSIS — Z98.890 S/P FOOT SURGERY, LEFT: ICD-10-CM

## 2024-09-19 DIAGNOSIS — M76.821 POSTERIOR TIBIAL TENDINITIS OF RIGHT LEG: Primary | ICD-10-CM

## 2024-09-19 DIAGNOSIS — G89.28 CHRONIC POSTOPERATIVE PAIN: ICD-10-CM

## 2024-09-19 PROCEDURE — 99203 OFFICE O/P NEW LOW 30 MIN: CPT | Performed by: PODIATRIST

## 2024-09-19 RX ORDER — MELOXICAM 15 MG/1
15 TABLET ORAL DAILY
Qty: 30 TABLET | Refills: 1 | Status: SHIPPED | OUTPATIENT
Start: 2024-09-19 | End: 2024-11-18

## 2024-09-19 NOTE — PROGRESS NOTES
Ambulatory Visit  Name: Treasure Chung      : 1965      MRN: [id number]  Encounter Provider: Fernando Jorge DPM  Encounter Date: 2024   Encounter department: [facility] [place]    Assessment & Plan  Posterior tibial tendinitis of right leg    Orders:    meloxicam (Mobic) 15 mg tablet; Take 1 tablet (15 mg total) by mouth daily    Chronic postoperative pain         S/P foot surgery, left         Notes and surgical records reviewed from orthopedics per Dr. Lachman.  Surgical procedures performed consist of left FDL to navicular transfer, cotton osteotomy of the medial cuneiform, medial calcaneal slide, and tendo Achilles lengthening.  Radiographs reviewed left foot.  Will discuss patient with Dr. Lachman to review his treatment plan.  Due to the moderate pain that she is experiencing in the left leg will initially immobilize her in her cam boot for 4 weeks with addition of some anti-inflammatories.  Recommend she ice her foot daily.  May remove cam boot for sleeping but should keep it on during the day.  Discussed treatment plan for posterior tibial tendinitis involving her right ankle and explained that the anti-inflammatories that she will be taking for the left will help that also.  Advised her to perform range of motion stretching exercises on both ankles as she had been previously instructed with physical therapy.  Follow-up in 6 weeks.      History of Present Illness     Treasure Chung is a 59 y.o. female who presents with two issues with 1 being new onset pain discomfort from her right ankle and the second related to extensive surgical correction of her left foot performed back in 2024.  Patient reports swelling has diminished in her left foot and ankle but still has pain which has not relented since the surgical procedure.  She reports that she needed to switch to a different physician because her work will not allow her to leave early enough for the available appointments  with the other physician.    History obtained from : patient  Review of Systems   Constitutional: Negative.    HENT: Negative.     Eyes: Negative.    Respiratory: Negative.     Cardiovascular: Negative.    Gastrointestinal: Negative.    Endocrine: Negative.    Genitourinary: Negative.    Musculoskeletal:  Positive for arthralgias.        Painful bilateral ankles left greater than right   Skin: Negative.    Allergic/Immunologic: Negative.    Neurological: Negative.    Hematological: Negative.    Psychiatric/Behavioral: Negative.       Past Medical History   Past Medical History:   Diagnosis Date    BCC (basal cell carcinoma of skin)     Cancer (HCC) basal cell    Fallen arches     Gout     Thumb    Hyperlipidemia     Hypertension     Plantar fasciitis     PONV (postoperative nausea and vomiting) 02/25/2024     Past Surgical History:   Procedure Laterality Date    BASAL CELL CARCINOMA EXCISION Left     nose    BELPHAROPTOSIS REPAIR      FOOT SURGERY      HAND SURGERY      HYSTERECTOMY      MOHS RECONSTRUCTION N/A 02/24/2021    Procedure: REPAIR MOH'S DEFECT OF NOSE;  Surgeon: Rolf Vazquez MD;  Location:  MAIN OR;  Service: Plastics    ID OSTEOTOMY CALCANEUS W/WO INTERNAL FIXATION Left 02/26/2024    Procedure: Medial slide calcaneal osteotomy, Achilles lengthening, FDL tendon transfer to the navicular, Cotton osteotomy;  Surgeon: James R Lachman, MD;  Location:  MAIN OR;  Service: Orthopedics    TUBAL LIGATION      WISDOM TOOTH EXTRACTION      WRIST SURGERY       Family History   Problem Relation Age of Onset    Hypertension Mother     Diabetes Mother     Heart failure Mother     Hyperlipidemia Mother     Arthritis Mother     Heart disease Mother     Hyperlipidemia Father     Stroke Father     Hypertension Father     Diabetes Father     Basal cell carcinoma Father     Arthritis Father     Heart disease Father     Hyperlipidemia Sister     Hypertension Sister     Diabetes Sister     Kidney failure Sister      Hyperlipidemia Sister     Hyperlipidemia Brother     Hypertension Brother     Diabetes Brother     Cancer Brother     Hyperlipidemia Brother     Heart disease Brother     Hypertension Brother     Diabetes Brother     Stroke Maternal Aunt     Heart disease Maternal Grandmother     Stroke Maternal Grandmother     Arthritis Maternal Grandmother     Diabetes Maternal Grandfather     Heart disease Maternal Grandfather     Diabetes Paternal Grandmother     Ovarian cancer Paternal Grandmother     Cancer Paternal Grandmother         Ovarian    Diabetes Paternal Grandfather     Arthritis Paternal Grandfather     Breast cancer Cousin     Heart disease Other     Leukemia Other     Arthritis Paternal Aunt     Diabetes Paternal Aunt     Arthritis Brother     Cancer Brother         not sure of type, removed a portion of the muscle that is at base of neck across to shoulder    Diabetes Brother     Hypertension Brother     Diabetes Sister     Hypertension Sister     Diabetes Paternal Aunt     Hypertension Sister     Thyroid disease Neg Hx      Current Outpatient Medications on File Prior to Visit   Medication Sig Dispense Refill    buPROPion (WELLBUTRIN XL) 300 mg 24 hr tablet Take 300 mg by mouth daily      cholecalciferol (VITAMIN D3) 1,000 units tablet Take 2,000 Units by mouth daily       lisinopril-hydrochlorothiazide (PRINZIDE,ZESTORETIC) 10-12.5 MG per tablet Take 1 tablet by mouth daily      traZODone (DESYREL) 150 mg tablet Take 150 mg by mouth daily at bedtime      aspirin (ECOTRIN) 325 mg EC tablet Take 1 tablet (325 mg total) by mouth 2 (two) times a day 84 tablet 0    calcium carbonate (OS-MARLON) 1250 (500 Ca) MG tablet Take 1 tablet by mouth daily      ondansetron (ZOFRAN) 4 mg tablet Take 1 tablet (4 mg total) by mouth every 8 (eight) hours as needed for nausea or vomiting 10 tablet 0    oxyCODONE (Roxicodone) 5 immediate release tablet Take 1 tablet (5 mg total) by mouth every 4 (four) hours as needed for severe pain  "for up to 30 doses Max Daily Amount: 30 mg 30 tablet 0     No current facility-administered medications on file prior to visit.     Allergies   Allergen Reactions    Cortisone Nausea Only and Bradycardia     Flushed face as well     Ciprofloxacin Rash    Medical Tape Blisters, Itching and Rash     Paper tape tolerated    If on too long redness => blisters    Other Allergic Rhinitis     Nasal symptoms          Objective     /78 (BP Location: Left arm, Patient Position: Sitting, Cuff Size: Large)   Ht 5' 5\" (1.651 m) Comment: stated  Wt 102 kg (224 lb)   BMI 37.28 kg/m²     Physical Exam  Vitals and nursing note reviewed.   Constitutional:       General: She is not in acute distress.     Appearance: Normal appearance. She is well-developed.   HENT:      Head: Normocephalic and atraumatic.      Nose: Nose normal.   Eyes:      Conjunctiva/sclera: Conjunctivae normal.   Cardiovascular:      Rate and Rhythm: Normal rate and regular rhythm.   Pulmonary:      Effort: Pulmonary effort is normal. No respiratory distress.   Musculoskeletal:         General: Tenderness present. No swelling.      Cervical back: Neck supple.      Left foot: Decreased range of motion.      Comments: Left foot: Moderate pain with palpation/range of motion subtalar joint and along the posterior tibial tendon course and medial incision.  Diminished edema reported by patient.  Osteotomy of calcaneus clinically stable and appears healed on lateral radiographs.  Implant for cotton osteotomy of medial cuneiform in place.  Patient does report a click sensation in the dorsal midfoot region but is not very specific about its location.  Guarded range of motion of subtalar joint.    Right foot: Pain with palpation posterior tibial tendon course along the medial ankle from the medial malleolus to the navicular insertion.  Localized induration present.  No significant pain with inversion eversion range of motion of the subtalar joint.       Feet:      " Right foot:      Protective Sensation: 10 sites tested.  10 sites sensed.      Left foot:      Protective Sensation: 10 sites tested.  10 sites sensed.   Skin:     General: Skin is warm and dry.      Capillary Refill: Capillary refill takes 2 to 3 seconds.   Neurological:      General: No focal deficit present.      Mental Status: She is alert.   Psychiatric:         Mood and Affect: Mood normal.

## 2024-10-03 DIAGNOSIS — Z00.6 ENCOUNTER FOR EXAMINATION FOR NORMAL COMPARISON OR CONTROL IN CLINICAL RESEARCH PROGRAM: ICD-10-CM

## 2024-10-26 ENCOUNTER — APPOINTMENT (OUTPATIENT)
Dept: LAB | Facility: CLINIC | Age: 59
End: 2024-10-26

## 2024-10-26 DIAGNOSIS — Z00.6 ENCOUNTER FOR EXAMINATION FOR NORMAL COMPARISON OR CONTROL IN CLINICAL RESEARCH PROGRAM: ICD-10-CM

## 2024-10-26 PROCEDURE — 36415 COLL VENOUS BLD VENIPUNCTURE: CPT

## 2024-10-31 ENCOUNTER — OFFICE VISIT (OUTPATIENT)
Dept: PODIATRY | Facility: CLINIC | Age: 59
End: 2024-10-31
Payer: COMMERCIAL

## 2024-10-31 VITALS
BODY MASS INDEX: 37.32 KG/M2 | WEIGHT: 224 LBS | HEIGHT: 65 IN | SYSTOLIC BLOOD PRESSURE: 121 MMHG | DIASTOLIC BLOOD PRESSURE: 84 MMHG | HEART RATE: 100 BPM

## 2024-10-31 DIAGNOSIS — G57.52 TARSAL TUNNEL SYNDROME, LEFT: ICD-10-CM

## 2024-10-31 DIAGNOSIS — M76.821 POSTERIOR TIBIAL TENDINITIS OF RIGHT LEG: ICD-10-CM

## 2024-10-31 DIAGNOSIS — Z98.890 S/P FOOT SURGERY, LEFT: ICD-10-CM

## 2024-10-31 DIAGNOSIS — G89.28 CHRONIC POSTOPERATIVE PAIN: Primary | ICD-10-CM

## 2024-10-31 PROCEDURE — 99213 OFFICE O/P EST LOW 20 MIN: CPT | Performed by: PODIATRIST

## 2024-10-31 RX ORDER — MELOXICAM 15 MG/1
15 TABLET ORAL DAILY
Qty: 30 TABLET | Refills: 1 | Status: SHIPPED | OUTPATIENT
Start: 2024-10-31 | End: 2024-12-30

## 2024-10-31 NOTE — LETTER
"PLANTAR FASCIITIS & HEEL PAIN  TENDONITIS: Achilles,  Posterior Tibial,  Peroneal,  Other……  \"Physical Therapy @ Home\" is absolutely necessary to obtain, and sustain a long lasting resolution of your heel pain or tendonitis.  If you perform the following you can greatly accelerate your recovery and reduce the chance of a recurrence, which is not uncommon.  Don't get frustrated it sometimes takes months to resolve 100%    The longer you have had this problem the longer it takes to resolve it…    MEDICATION:   If prescribed take as instructed with food, never on an empty stomach.  Stop taking if you have any side effects. (ie.Rash, GI upset, Acid Reflux/Heartburn)  Motrin/Ibuprofen or ALEVE/Naproxen   Rx NSAIDS (ie. Mobic, Diclofenac, Celebrex etc)  ICE:   Apply daily for 10 min. intervals, waiting 5 min. before next application.   Three applications over 45 min. should be done after work, or immediately following an athletic/strenuous event.  Frozen water bottles can be used to ice and massage rolling on floor.  No heat unless instructed.  STRETCHING:  Any stretching activity of  your Achilles Tendon/Calf muscles in the lower leg will also stretch your Plantar Fasciia.    Hold stretch for 8-10 sec., Repeat 10-12 times per setting, for (5-6) times per day.    Stand arms length away from a wall, place foot to be stretched half a shoe length behind the other, then step forward with the other performing a lazy push-up against the wall.  MASSAGE:  Deep friction technique using moisturizer for 10-15 min. daily.  Circles/Figure Eight's with Toes reversing direction for Tendonitis  ORTHOTICS:   Can be very helpful but varies based on foot structure   Custom or basic off the shelf products.  Many options available.  Powerstep [place] is an effective OTC device ([employer]).  PROPER SHOE SELECTION:  Shoes must have adequate support and structural integrity.  Forget loafers, flip-flops, and slip on flats!  Lace up type shoes are " recommended due to their ability to fit orthotics/arch supports.  Some athletic type sandals are acceptable.  Running type sneakers are the best choice.   (Nicholase, New Balance 1540 or 940, ASICS, Ramsey Adrenaline GTS)  GENERAL ADVICE:   If it hurts slow down or stop the activity.  Avoid certain activities (jumping, climbing ladders, steep inclines/uneven terrain)    Put foot through range of motion if tight prior to getting up after inactivity/resting/sleeping.    Distance running and Treadmills can aggravate the situation and prolong recovery.  Bicycle/Elliptical is better than Jogging/Running.

## 2024-10-31 NOTE — PROGRESS NOTES
Assessment/Plan:   Since patient cannot complete organized physical therapy several recommendations were made to patient for her to do at home.  Clockwise circles, counterclockwise circles, figure eights, and other range of motion exercises for the ankle reviewed.  Achilles tendon stretching with icing at the end of the day.  Patient should only use the cam boot if the issue seems to be exacerbated by something otherwise she should try and ambulate normally with her athletic shoe gear.  Reports her change in shoe gear did seem to help and is more comfortable.  Dispensed instructions on stretching range of motion and icing.  Rx meloxicam 15 mg once a day with food never an empty stomach.  Recommend compression stockings for lower extremity edema and not a tight ankle wrap.  Symptoms suggest possibly a tarsal tunnel entrapment due to the extensive nature of her medial rear foot surgery and potential scarring that was involved in the healing process postoperatively.  This may be more than normal as she did not rehabilitate with physical therapy.  Patient's progress and case were reviewed with Dr. Lachman via secure chat.  Follow-up in approximately 8 weeks.       There are no diagnoses linked to this encounter.      Subjective:     Patient ID: Treasure Chung is a 59 y.o. female.    10/31/2024: 59-year-old female seen today for follow-up reporting her foot did improvement and felt much better while in cam boot.  After removing cam boot and starting to use it again pain and discomfort started to increase.  She reports that her left foot is less painful than it was.  Reviewed with patient the need for physical therapy but she declines at this point due to the financial obligation that it would require.      9/19/2024: 59 y.o. female who presents with two issues with 1 being new onset pain discomfort from her right ankle and the second related to extensive surgical correction of her left foot performed back in February  2024.  Patient reports swelling has diminished in her left foot and ankle but still has pain which has not relented since the surgical procedure.  She reports that she needed to switch to a different physician because her work will not allow her to leave early enough for the available appointments with the other physician.        Review of Systems   Constitutional: Negative.    HENT: Negative.     Eyes: Negative.    Respiratory: Negative.     Cardiovascular: Negative.    Gastrointestinal: Negative.    Endocrine: Negative.    Genitourinary: Negative.    Musculoskeletal:  Positive for arthralgias and gait problem.        Painful bilateral ankles left greater than right   Skin: Negative.    Allergic/Immunologic: Negative.    Neurological:  Positive for numbness.   Hematological: Negative.    Psychiatric/Behavioral: Negative.           Objective:     Physical Exam  Constitutional:       Appearance: Normal appearance.   HENT:      Head: Normocephalic.      Right Ear: External ear normal.      Left Ear: External ear normal.   Eyes:      Pupils: Pupils are equal, round, and reactive to light.   Cardiovascular:      Rate and Rhythm: Normal rate.      Pulses: Normal pulses.   Pulmonary:      Effort: Pulmonary effort is normal.   Musculoskeletal:         General: Swelling and tenderness present.      Cervical back: Normal range of motion.      Left foot: Decreased range of motion.      Comments:   Left foot: Moderate pain with palpation and range of motion of subtalar joint along the posterior tibial tendon course medially.  This also corresponds to the tendon transfer to the navicular.  The sensations are somewhat rough suggest a tarsal tunnel syndrome.  Overall improved from last visit.  Subtalar joint range of motion is limited but guarded.   Feet:      Right foot:      Protective Sensation: 10 sites tested.  10 sites sensed.      Skin integrity: Skin integrity normal.      Left foot:      Protective Sensation: 10 sites  tested.  10 sites sensed.      Skin integrity: Skin integrity normal.   Skin:     General: Skin is warm and dry.      Capillary Refill: Capillary refill takes 2 to 3 seconds.   Neurological:      General: No focal deficit present.      Mental Status: She is alert.      Sensory: Sensory deficit present.      Comments: Tingling paresthesias along the tarsal tunnel surgical incision.  With hypersensitivity to touch.   Psychiatric:         Mood and Affect: Mood normal.

## 2024-11-05 LAB
APOB+LDLR+PCSK9 GENE MUT ANL BLD/T: NOT DETECTED
BRCA1+BRCA2 DEL+DUP + FULL MUT ANL BLD/T: NOT DETECTED
MLH1+MSH2+MSH6+PMS2 GN DEL+DUP+FUL M: NOT DETECTED

## 2024-11-07 ENCOUNTER — TELEPHONE (OUTPATIENT)
Age: 59
End: 2024-11-07

## 2024-11-07 NOTE — TELEPHONE ENCOUNTER
Caller: Treasure Chung    Doctor: Luisito    Reason for call: Treasure's left foot is in a lot of pain.  She is doing the exercises. And she is taking the Meloxocam.  She is also icing it at least 6 times per day.  But the pain is getting unbearable.  Can someone please reach out to her?     Call back#:[phone]

## 2024-11-08 NOTE — TELEPHONE ENCOUNTER
Called and talked with the patient. Was gonna schedule her with Lachman she can't get off to come in to our clinic she works until 4 and can't take time off to get her. She can keep the appt with Dr. Jorge because he has the later appt.

## 2024-11-19 ENCOUNTER — OFFICE VISIT (OUTPATIENT)
Dept: BARIATRICS | Facility: CLINIC | Age: 59
End: 2024-11-19
Payer: COMMERCIAL

## 2024-11-19 VITALS
HEIGHT: 65 IN | HEART RATE: 76 BPM | BODY MASS INDEX: 41.14 KG/M2 | DIASTOLIC BLOOD PRESSURE: 90 MMHG | RESPIRATION RATE: 17 BRPM | SYSTOLIC BLOOD PRESSURE: 120 MMHG | TEMPERATURE: 96.8 F | WEIGHT: 246.91 LBS

## 2024-11-19 DIAGNOSIS — E66.01 OBESITY, CLASS III, BMI 40-49.9 (MORBID OBESITY) (HCC): Primary | ICD-10-CM

## 2024-11-19 DIAGNOSIS — E78.5 HYPERLIPIDEMIA, UNSPECIFIED HYPERLIPIDEMIA TYPE: ICD-10-CM

## 2024-11-19 DIAGNOSIS — I10 ESSENTIAL HYPERTENSION: ICD-10-CM

## 2024-11-19 DIAGNOSIS — F41.9 ANXIETY AND DEPRESSION: ICD-10-CM

## 2024-11-19 DIAGNOSIS — F32.A ANXIETY AND DEPRESSION: ICD-10-CM

## 2024-11-19 PROCEDURE — 99214 OFFICE O/P EST MOD 30 MIN: CPT | Performed by: STUDENT IN AN ORGANIZED HEALTH CARE EDUCATION/TRAINING PROGRAM

## 2024-11-19 RX ORDER — TIRZEPATIDE 2.5 MG/.5ML
2.5 INJECTION, SOLUTION SUBCUTANEOUS WEEKLY
Qty: 2 ML | Refills: 0 | Status: SHIPPED | OUTPATIENT
Start: 2024-11-19 | End: 2024-12-17

## 2024-11-19 NOTE — PROGRESS NOTES
Assessment & Plan  Obesity, Class III, BMI 40-49.9 (morbid obesity) (HCC)    Starting Zepbound 2.5 mg, increase to 5 mg after 4 weeks    Side effects: nausea, vomiting, constipation, alopecia, fatigue and reflux  Precautions: Denies history of pancreatitis, cholelithiasis, kidney injury, suicidal behavior and ideation, diabetic retinopathy.    Contraindications: Denies Men 2 syndrome and family/personal history of medullary thyroid cancer.      Patient understands the risk and benefits of using this medication as well as the side effects.  Medication management contract signed.  Obesogenic Medications: Trazodone    Patient has been overweight for many years.  They have tried many different ways to lose weight including various diets regimens which have been unsuccessful. As per FDA guidelines, patient has a BMI greater than 40 associated with comorbidities which include hyperlipidemia, anxiety depression, hypertension      Initial weight loss goal of 5-10% weight loss for improved health as studies have shown this has proven to decrease risk of obesity related conditions and co-morbid conditions and/or prevent weight-related complications. Explained the importance of making lifestyle modifications changes in conjunction with weight loss medications.    Discussed options of HealthyCORE-Intensive Lifestyle Intervention Program, Very Low Calorie Diet-VLCD, and Conservative Program and the role of weight loss medications.Patient is interested in pursuing Conservative Program.     Recommended to not skip meals.  Spoke about practicing mindful eating with frequent meals and portion control.  Recommend to avoid snacking at bedtime especially with a high carbohydrate load and to be consistent with protein intake.  Recommended Food logs.  Recommend adequate hydration which is at least half your body weight in ounces unless medically contraindicated (ie. Systolic heart failure) with also considering GI losses and medication  induced fluid loss if applicable. Recommend incorporating resistance training and/or strength training to help improve metabolic rate.     Initial weight: 240  Goal TBW loss in 3 months %: 12 pounds which is equivalent to 5%      Hyperlipidemia  Currently diet controlled, should improve with weight loss  Anxiety and depression  Continue Wellbutrin 300 mg, can also help with weight loss with appetite suppression but patient reports that she is no longer losing weight like she did when she initially started.  It does greatly help her mental health overall.  She is also on trazodone which helps with her sleep    Essential hypertension  Continue lisinopril hydrochlorothiazide,         Total time spent reviewing chart, interviewing patient, examining patient, discussing plan, answering all questions, and documentin min. Most recent notes and records were reviewed.       ______________________________________________________________________        Subjective:     Chief Complaint   Patient presents with    Follow-up     MWM-OD-F/u; Waist-43.5in       HPI: Treasure Chung  is a 59 y.o. female with past medical history of class II obesity, History of GERD, hypertension, hyperlipidemia, Depression   presents to the clinic for follow-up.  Patient is currently on Wellbutrin 300 mg which she initially stated that it did help with appetite suppression and helped her lose weight but she plateaued years ago.  She has attempted to lose weight multiple times with diet but she has also limited with exercise for the past year because of posterior tibial tendon dysfunction for the past year    Current Medication: She is on Wellbutrin for anxiety and depression which initially helped for appetite suppression as well years ago.    Medication Side effects:    Weight loss plan:  Conservative Program.     Diet: She does not have a particular diet regiment consist of fried foods, solids, potatoes lasagna.  She does skip breakfast at  "times  Hydration: 94 ounces   Food log:  no   Exercised: November 2023  Occupation: accounting     [employer]engenic: trazodone     Patient denies personal and family history of chronic/recurrent pancreatitis, thyroid cancer, MEN-2 tumors. Denies any hx of glaucoma, seizures, kidney stones, gallstones, CAD, PAD, palpitations, arrhythmia. Denies uncontrolled anxiety or depression, suicidal ideation or behavior, insomnia or sleep disturbance.     Review Of Systems:  Constitutional:  Negative for diaphoresis.   Gastrointestinal:  Negative for abdominal pain, constipation, nausea and vomiting.   Skin:  Negative for pallor.   Psychiatric/Behavioral:  Negative for agitation, behavioral problems, confusion, dysphoric mood and hallucinations.    All other systems reviewed and are negative.     Objective:  /90   Pulse 76   Temp (!) 96.8 °F (36 °C)   Resp 17   Ht 5' 5\" (1.651 m)   Wt 108 kg (238 lb)   BMI 39.61 kg/m²     Wt Readings from Last 10 Encounters:   11/19/24 108 kg (238 lb)   10/31/24 102 kg (224 lb)   09/19/24 102 kg (224 lb)   06/25/24 102 kg (225 lb)   04/19/24 97.5 kg (215 lb)   04/10/24 97.5 kg (215 lb)   04/01/24 97.5 kg (215 lb)   03/20/24 97.5 kg (215 lb)   02/26/24 93.4 kg (206 lb)   02/20/24 92.1 kg (203 lb)       Physical Exam  Constitutional:       General: No acute distress.   HENT:      Head: Normocephalic and atraumatic.      Nose: Nose normal.   Eyes:      Extraocular Movements: Extraocular movements intact.      Conjunctiva/sclera: Conjunctivae normal.      Pupils: Pupils are equal, round, and reactive to light.   Cardiovascular:      Rate and Rhythm: Normal rate.   Pulmonary:      Effort: Pulmonary effort is normal.   Neurological:      General: No focal deficit present.      Mental Status: She is alert and oriented to person, place, and time. Mental status is at baseline.   Psychiatric:         Mood and Affect: Mood normal.         Behavior: Behavior normal.     Labs and Imaging  Recent labs " "and imaging have been personally reviewed.  No results found for: \"WBC\", \"HGB\", \"HCT\", \"MCV\", \"PLT\"    Lab Results   Component Value Date    SODIUM 143 11/16/2024    K 4.6 11/16/2024     11/16/2024    CO2 30 11/16/2024    AGAP 7 11/16/2024    BUN 19 11/16/2024    CREATININE 0.84 11/16/2024    GLUC 82 11/16/2024    CALCIUM 8.8 11/16/2024    AST 13 11/16/2024    ALT 11 11/16/2024    ALKPHOS 77 11/16/2024    TP 6.3 11/16/2024    TBILI 0.4 11/16/2024    EGFR 81 11/16/2024     No results found for: \"HGBA1C\"    Lab Results   Component Value Date    TSH 1.56 11/16/2024     No results found for: \"CHOLESTEROL\"  No results found for: \"HDL\"  No results found for: \"TRIG\"  No results found for: \"LDLCALC\"    "

## 2024-11-20 ENCOUNTER — TELEPHONE (OUTPATIENT)
Dept: BARIATRICS | Facility: CLINIC | Age: 59
End: 2024-11-20

## 2024-11-20 NOTE — TELEPHONE ENCOUNTER
PA for Zepbound 2.5mg SUBMITTED to [employer]     via    [x]CMM-KEY: [id number]  []Surescripts-Case ID #   []Availity-Auth ID # NDC #   []Faxed to plan   []Other website   []Phone call Case ID #     []PA sent as URGENT    All office notes, labs and other pertaining documents and studies sent. Clinical questions answered. Awaiting determination from insurance company.     Turnaround time for your insurance to make a decision on your Prior Authorization can take 7-21 business days.

## 2024-11-21 NOTE — TELEPHONE ENCOUNTER
PA for Zepbound 2.5mg APPROVED     Date(s) approved 9/21/2024 - 6/20/2025    Case #    Patient advised by          []SceneChathart Message  [x]Phone call   []LMOM  []L/M to call office as no active Communication consent on file  []Unable to leave detailed message as VM not approved on Communication consent       Pharmacy advised by    [x]Fax  []Phone call    Approval letter scanned into Media Yes

## 2024-12-12 ENCOUNTER — NURSE TRIAGE (OUTPATIENT)
Age: 59
End: 2024-12-12

## 2024-12-12 NOTE — TELEPHONE ENCOUNTER
"PT called in requesting next dose of Zepbound. PT currently on 2.5mg. Prescribed by Dr. Carrasquillo. PT has 1 dose(s) left; last dose was Saturday. PT reports that first 2 weeks, having anxiety and increased HR (max 122) when getting up from sitting; reports that HR went back down to normal when she sat back down. PT reports a lot of life stressors and not sleeping well, also didn't take nightly trazodone, and unsure if that may be r/t anxiety and inability to sleep. PT reports relief during this past week. PT also endorses x2 days of self-limiting diarrhea, but believes that she had a virus. Current weight loss is 15lbs. PT also reports relief of GERD. Please send script to [facility].  Answer Assessment - Initial Assessment Questions  1. NAME of MEDICINE: \"What medicine(s) are you calling about?\"      Zepbound  2. QUESTION: \"What is your question?\" (e.g., double dose of medicine, side effect)      Next dose request  3. PRESCRIBER: \"Who prescribed the medicine?\" Reason: if prescribed by specialist, call should be referred to that group.      Dr. Carrasquillo  4. SYMPTOMS: \"Do you have any symptoms?\" If Yes, ask: \"What symptoms are you having?\"  \"How bad are the symptoms (e.g., mild, moderate, severe)      See note  5. PREGNANCY:  \"Is there any chance that you are pregnant?\" \"When was your last menstrual period?\"      no    Protocols used: Medication Question Call-Adult-[place]    "

## 2024-12-13 DIAGNOSIS — F41.9 ANXIETY AND DEPRESSION: ICD-10-CM

## 2024-12-13 DIAGNOSIS — E78.5 HYPERLIPIDEMIA, UNSPECIFIED HYPERLIPIDEMIA TYPE: ICD-10-CM

## 2024-12-13 DIAGNOSIS — I10 ESSENTIAL HYPERTENSION: ICD-10-CM

## 2024-12-13 DIAGNOSIS — E66.01 OBESITY, CLASS III, BMI 40-49.9 (MORBID OBESITY) (HCC): Primary | ICD-10-CM

## 2024-12-13 DIAGNOSIS — F32.A ANXIETY AND DEPRESSION: ICD-10-CM

## 2024-12-13 DIAGNOSIS — E66.01 OBESITY, CLASS III, BMI 40-49.9 (MORBID OBESITY) (HCC): ICD-10-CM

## 2024-12-13 RX ORDER — TIRZEPATIDE 5 MG/.5ML
5 INJECTION, SOLUTION SUBCUTANEOUS WEEKLY
Qty: 2 ML | Refills: 3 | Status: SHIPPED | OUTPATIENT
Start: 2024-12-13 | End: 2024-12-18 | Stop reason: SDUPTHER

## 2024-12-13 NOTE — Clinical Note
Increasing patient's medication dose to 5 mg.  Please make sure to inform patient to eat frequently throughout the day even though she does not feel hungry as well as fluids as discussed last visit to prevent any side effects stemming from dietary habits

## 2024-12-13 NOTE — TELEPHONE ENCOUNTER
Patient requested this prescription be sent to [facility] - [facility] - [address] [phone]  I offered to cancel and resend but she said she will contact the pharmacy to transfer.

## 2024-12-17 ENCOUNTER — TELEPHONE (OUTPATIENT)
Dept: BARIATRICS | Facility: CLINIC | Age: 59
End: 2024-12-17

## 2024-12-17 NOTE — TELEPHONE ENCOUNTER
----- Message from Huber Carrasquillo MD sent at 12/13/2024  3:53 PM EST -----  Increasing patient's medication dose to 5 mg.  Please make sure to inform patient to eat frequently throughout the day even though she does not feel hungry as well as fluids as discussed last visit to prevent any side effects stemming from dietary habits

## 2024-12-18 RX ORDER — TIRZEPATIDE 5 MG/.5ML
5 INJECTION, SOLUTION SUBCUTANEOUS WEEKLY
Qty: 2 ML | Refills: 3 | Status: SHIPPED | OUTPATIENT
Start: 2024-12-18

## 2024-12-18 NOTE — TELEPHONE ENCOUNTER
Script should have been sent to [facility]. The script was sent to [facility].

## 2025-01-15 ENCOUNTER — TELEPHONE (OUTPATIENT)
Age: 60
End: 2025-01-15

## 2025-01-15 NOTE — TELEPHONE ENCOUNTER
Patient of Dr Carrasquillo calling about her Zepbound 5mg.    States she just completed her 4th injection of 5mg.  She has tolerated well with minimal nausea and some fatigue.  She stated that she only lost 3-4lbs on this dose. She continues to feel the effects of decreased appetite and decreased cravings, but is wondering about the weight loss.    She is questioning if her dose should be increased. She does not want to refill the Zepbound 5mg if the provider is going to increase her dose. She has no injections remaining and her next dose is due Saturday.  She has an office visit scheduled for this Friday 1/17.    Please advise appropriate next dose for patient. Agreeable to phone call or [employer] message.    #[phone]

## 2025-01-16 ENCOUNTER — TELEPHONE (OUTPATIENT)
Dept: BARIATRICS | Facility: CLINIC | Age: 60
End: 2025-01-16

## 2025-01-16 NOTE — TELEPHONE ENCOUNTER
Time Sensitive Message: Patient of . Called yesterday to find out if she will be staying on the current dose of Zepbound 5 mg or going up to the next dose. She has 2 refills left. Her concern is if she needs a new prescription for the next dose her pharmacy is not open Saturday and that leaves her today or tomorrow. Please advise

## 2025-02-19 ENCOUNTER — TELEPHONE (OUTPATIENT)
Age: 60
End: 2025-02-19

## 2025-02-19 NOTE — TELEPHONE ENCOUNTER
Patient called to reschedule 5 week follow up appointment. Warm transferred to [facility] for further assistance.

## 2025-03-03 DIAGNOSIS — F41.9 ANXIETY AND DEPRESSION: ICD-10-CM

## 2025-03-03 DIAGNOSIS — E78.5 HYPERLIPIDEMIA, UNSPECIFIED HYPERLIPIDEMIA TYPE: ICD-10-CM

## 2025-03-03 DIAGNOSIS — E66.01 OBESITY, CLASS III, BMI 40-49.9 (MORBID OBESITY) (HCC): ICD-10-CM

## 2025-03-03 DIAGNOSIS — F32.A ANXIETY AND DEPRESSION: ICD-10-CM

## 2025-03-03 DIAGNOSIS — I10 ESSENTIAL HYPERTENSION: ICD-10-CM

## 2025-03-03 RX ORDER — TIRZEPATIDE 5 MG/.5ML
5 INJECTION, SOLUTION SUBCUTANEOUS WEEKLY
Qty: 2 ML | Refills: 0 | Status: SHIPPED | OUTPATIENT
Start: 2025-03-03 | End: 2025-03-10 | Stop reason: SDUPTHER

## 2025-03-04 DIAGNOSIS — E78.5 HYPERLIPIDEMIA, UNSPECIFIED HYPERLIPIDEMIA TYPE: ICD-10-CM

## 2025-03-04 DIAGNOSIS — F32.A ANXIETY AND DEPRESSION: ICD-10-CM

## 2025-03-04 DIAGNOSIS — F41.9 ANXIETY AND DEPRESSION: ICD-10-CM

## 2025-03-04 DIAGNOSIS — I10 ESSENTIAL HYPERTENSION: ICD-10-CM

## 2025-03-04 DIAGNOSIS — E66.01 OBESITY, CLASS III, BMI 40-49.9 (MORBID OBESITY) (HCC): ICD-10-CM

## 2025-03-10 ENCOUNTER — OFFICE VISIT (OUTPATIENT)
Dept: BARIATRICS | Facility: CLINIC | Age: 60
End: 2025-03-10
Payer: COMMERCIAL

## 2025-03-10 VITALS
HEART RATE: 108 BPM | HEIGHT: 65 IN | TEMPERATURE: 98.4 F | BODY MASS INDEX: 33.26 KG/M2 | SYSTOLIC BLOOD PRESSURE: 128 MMHG | RESPIRATION RATE: 16 BRPM | DIASTOLIC BLOOD PRESSURE: 88 MMHG | WEIGHT: 199.6 LBS

## 2025-03-10 DIAGNOSIS — I10 ESSENTIAL HYPERTENSION: ICD-10-CM

## 2025-03-10 DIAGNOSIS — F32.A ANXIETY AND DEPRESSION: ICD-10-CM

## 2025-03-10 DIAGNOSIS — E66.01 OBESITY, CLASS III, BMI 40-49.9 (MORBID OBESITY) (HCC): ICD-10-CM

## 2025-03-10 DIAGNOSIS — E78.5 HYPERLIPIDEMIA, UNSPECIFIED HYPERLIPIDEMIA TYPE: ICD-10-CM

## 2025-03-10 DIAGNOSIS — E66.01 CLASS 3 SEVERE OBESITY DUE TO EXCESS CALORIES WITH BODY MASS INDEX (BMI) OF 40.0 TO 44.9 IN ADULT (HCC): Primary | ICD-10-CM

## 2025-03-10 DIAGNOSIS — E66.813 CLASS 3 SEVERE OBESITY DUE TO EXCESS CALORIES WITH BODY MASS INDEX (BMI) OF 40.0 TO 44.9 IN ADULT (HCC): Primary | ICD-10-CM

## 2025-03-10 DIAGNOSIS — F41.9 ANXIETY AND DEPRESSION: ICD-10-CM

## 2025-03-10 PROCEDURE — 99214 OFFICE O/P EST MOD 30 MIN: CPT | Performed by: STUDENT IN AN ORGANIZED HEALTH CARE EDUCATION/TRAINING PROGRAM

## 2025-03-10 RX ORDER — TIRZEPATIDE 5 MG/.5ML
5 INJECTION, SOLUTION SUBCUTANEOUS WEEKLY
Qty: 2 ML | Refills: 4 | Status: SHIPPED | OUTPATIENT
Start: 2025-03-10

## 2025-06-05 ENCOUNTER — APPOINTMENT (OUTPATIENT)
Dept: RADIOLOGY | Facility: MEDICAL CENTER | Age: 60
End: 2025-06-05
Attending: STUDENT IN AN ORGANIZED HEALTH CARE EDUCATION/TRAINING PROGRAM
Payer: COMMERCIAL

## 2025-06-05 ENCOUNTER — OFFICE VISIT (OUTPATIENT)
Dept: OBGYN CLINIC | Facility: MEDICAL CENTER | Age: 60
End: 2025-06-05
Payer: COMMERCIAL

## 2025-06-05 VITALS — WEIGHT: 186 LBS | HEIGHT: 65 IN | BODY MASS INDEX: 30.99 KG/M2

## 2025-06-05 DIAGNOSIS — M25.512 LEFT SHOULDER PAIN, UNSPECIFIED CHRONICITY: Primary | ICD-10-CM

## 2025-06-05 DIAGNOSIS — M25.512 LEFT SHOULDER PAIN, UNSPECIFIED CHRONICITY: ICD-10-CM

## 2025-06-05 DIAGNOSIS — M67.912 ROTATOR CUFF DYSFUNCTION, LEFT: ICD-10-CM

## 2025-06-05 DIAGNOSIS — M75.52 BURSITIS OF LEFT SHOULDER: ICD-10-CM

## 2025-06-05 PROCEDURE — 73030 X-RAY EXAM OF SHOULDER: CPT

## 2025-06-05 PROCEDURE — 99204 OFFICE O/P NEW MOD 45 MIN: CPT | Performed by: STUDENT IN AN ORGANIZED HEALTH CARE EDUCATION/TRAINING PROGRAM

## 2025-06-05 RX ORDER — FAMOTIDINE 20 MG/1
TABLET, FILM COATED ORAL
COMMUNITY
Start: 2024-08-26

## 2025-06-05 RX ORDER — CELECOXIB 100 MG/1
100 CAPSULE ORAL 2 TIMES DAILY
Qty: 60 CAPSULE | Refills: 0 | Status: SHIPPED | OUTPATIENT
Start: 2025-06-05

## 2025-06-05 RX ORDER — MULTIVIT-MINERALS/FOLIC ACID 80 MCG
TABLET,CHEWABLE ORAL
COMMUNITY
Start: 2024-12-01

## 2025-06-05 RX ORDER — ONDANSETRON 4 MG/1
4 TABLET, FILM COATED ORAL DAILY PRN
COMMUNITY
Start: 2025-05-23

## 2025-06-05 NOTE — PROGRESS NOTES
Orthopedic Surgery Office Note  Treasure Chung (60 y.o. female)   : 1965   MRN: [id number]   Encounter Date: 2025 with Dr. Sandor Romero, DO  Encounter department: [facility] [place]  Chief Complaint   Patient presents with    Left Shoulder - Pain       Assessment, Plan, & Discussion:     :  Assessment & Plan  Left shoulder pain, unspecified chronicity  Bursitis of left shoulder  Rotator cuff dysfunction, left  History of 6 months of symptoms and worse over past month with no injury  Current symptoms of left generalized shoulder pain  No radiating symptoms  Radiograph was unremarkable  On exam patient has good ROM with good strength yet pain with resisted ER and IR  We discussed patient has very minimal arthritis of shoulder yet likely has left shoulder bursitis and rotator cuff dysfunction  Surgery is not recommended for this issue  Patient avoids steroids due to facial flush and rapid heart rate with past steroid injection  Patient to start physical therapy  Patient would like to attend 1-2 sessions due to high copay  Celebrex 100mg BID prescribed for one month yet patient to use for two weeks and then as needed for next two weeks  The patient can follow up as needed and is welcome to return at any point with any new or old issue.        Orders:    XR shoulder 2+ vw left; Future    Ambulatory referral to Physical Therapy; Future      Celebrex 100mg BID 1 mo worth  Start PT  FU PRN      Surgery:   No surgery planned at this time    Next Visit:      Follow up:  Return if symptoms worsen or fail to improve.        History of Present Illness:     Treasure Chung is a 60 y.o. female who presents for initial evaluation of left shoulder.  She has had symptoms for about 5-6 months with increase of symptoms over past month.  Today she complains of left posterior and generalized shoulder pain and weakness.  She denies radiating symptoms into arm.  She rates her pain at 8-9/10 at its  "worse.  Quick movements and overhead activity aggravates while rest alleviates.  She denies medications for this issue.  She denies past left shoulder or neck physical therapy, injections surgery.  She admits to past posterior neck cyst excision.      She notes past facial flush and rapid heart rate from past steroid injection.    Review of Systems  Constitutional: Negative for fatigue, fever or loss of appetite.   HENT: Negative.    Respiratory: Negative for shortness of breath, dyspnea.    Cardiovascular: Negative for chest pain/tightness.   Gastrointestinal: Negative for abdominal pain, N/V.   Endocrine: Negative for cold/heat intolerance, unexplained weight loss/gain.   Genitourinary: Negative for flank pain, dysuria  Skin: Negative for rash.    Psychiatric/Behavioral: Negative for agitation.  All else negative unless otherwise noted in HPI    Physical Exam:   General:  Ht 5' 5\" (1.651 m)   Wt 84.4 kg (186 lb)   BMI 30.95 kg/m²   Estimated body mass index is 30.95 kg/m² as calculated from the following:    Height as of this encounter: 5' 5\" (1.651 m).    Weight as of this encounter: 84.4 kg (186 lb).  Cons: Appears well.  No apparent distress.  Psych: Alert. Oriented.  Mood and affect normal.  HEENT: PERRLA, EOMI, Eyes clear, moist mucus membranes, hearing intact  Resp: Normal effort.  No audible wheezing or stridor. equal symmetric chest expansion.  CV: Extremities warm and well perfused. no chest pain, no palpitations, no discernable arrhythmia  Well Perfused peripherally, palpable distal pulse  Abd:    No distention or guarding. no peritoneal signs  Skin: Warm. No visible lesions.no clubbing, no cyanosis  Neuro: Normal muscle tone.     Orthopedic Exam:   Left shoulder:  No erythema or ecchymosis  Active   Active Abduction 165  5/5/ strength with resisted abduction  5/5 with resisted ER and IR  Sensation intact distally     Imaging/Studies:     Study: Left shoulder x-ray  Date: 6/5/2025  Report: I have " personally reviewed the imaging via [employer] PACS (Picture Archiving and Communication System) and my impression is as follows:  Impression: Very mild glenohumeral arthritic changes.      Procedures  No procedures today.    Medical, Surgical, Family, and Social History    The patient's medical history, family history, and social history, were reviewed and updated as appropriate.  Past Medical History[1]  Past Surgical History[2]  Family History[3]  Social History     Occupational History    Occupation:    Tobacco Use    Smoking status: Former     Current packs/day: 0.00     Average packs/day: 1 pack/day for 20.0 years (20.0 ttl pk-yrs)     Types: Cigarettes     Start date:      Quit date:      Years since quittin.4     Passive exposure: Past    Smokeless tobacco: Never   Vaping Use    Vaping status: Never Used   Substance and Sexual Activity    Alcohol use: Yes     Alcohol/week: 1.0 standard drink of alcohol     Types: 1 Glasses of wine per week     Comment: occasionally, social    Drug use: Never    Sexual activity: Yes     Partners: Male     Birth control/protection: Surgical     Comment: partial hysterectomy     Allergies[4]  Current Medications[5]  This Visit:     Scribe Attestation      I,:  Socrates Velasquez MA am acting as a scribe while in the presence of the attending physician.:       I,:  Sandor Romero DO personally performed the services described in this documentation    as scribed in my presence.:             Dr. Sandor Romero DO, Orthopedic Surgeon  Orthopedic Oncology & [facility]          [1]   Past Medical History:  Diagnosis Date    BCC (basal cell carcinoma of skin)     Cancer (HCC) basal cell    [place]     Gout     Thumb    Hyperlipidemia     Hypertension     Plantar fasciitis     PONV (postoperative nausea and vomiting) 2024   [2]   Past Surgical History:  Procedure Laterality Date    BASAL CELL CARCINOMA EXCISION Left     nose    BELPHAROPTOSIS REPAIR       FOOT SURGERY      HAND SURGERY      HYSTERECTOMY      MOHS RECONSTRUCTION N/A 02/24/2021    Procedure: REPAIR MOH'S DEFECT OF NOSE;  Surgeon: Rolf Vazquez MD;  Location:  MAIN OR;  Service: Plastics    UT OSTEOTOMY CALCANEUS W/WO INTERNAL FIXATION Left 02/26/2024    Procedure: Medial slide calcaneal osteotomy, Achilles lengthening, FDL tendon transfer to the navicular, Cotton osteotomy;  Surgeon: James R Lachman, MD;  Location:  MAIN OR;  Service: Orthopedics    TUBAL LIGATION      WISDOM TOOTH EXTRACTION      WRIST SURGERY     [3]   Family History  Problem Relation Name Age of Onset    Hypertension Mother Porsha     Diabetes Mother Porsha     Heart failure Mother Porsha     Hyperlipidemia Mother Porsha     Arthritis Mother Porsha     Heart disease Mother Porsha     Hyperlipidemia Father Armond     Stroke Father Armond     Hypertension Father Armond     Diabetes Father Armond     Basal cell carcinoma Father Armond     Arthritis Father Armond     Heart disease Father Armond     Hyperlipidemia Sister      Hypertension Sister      Diabetes Sister      Kidney failure Sister      Hyperlipidemia Sister      Hyperlipidemia Brother      Hypertension Brother      Diabetes Brother      Cancer Brother      Hyperlipidemia Brother Bill     Heart disease Brother Bill     Hypertension Brother Bill     Diabetes Brother Bill     Stroke Maternal Aunt      Heart disease Maternal Grandmother Mamie     Stroke Maternal Grandmother Mamie     Arthritis Maternal Grandmother Mamie     Diabetes Maternal Grandfather Blane     Heart disease Maternal Grandfather Blane     Diabetes Paternal Grandmother Shira     Ovarian cancer Paternal Grandmother Shira     Cancer Paternal Grandmother Shira         Ovarian    Diabetes Paternal Grandfather Heath     Arthritis Paternal Grandfather Heath     Breast cancer Cousin      Heart disease Other neice     Leukemia Other neice     Arthritis Paternal Aunt Porsha     Diabetes Paternal  Aunt Porsha     Arthritis Brother Vince     Cancer Brother Vince         not sure of type, removed a portion of the muscle that is at base of neck across to shoulder    Diabetes Brother Vince     Hypertension Brother Vince     Diabetes Sister Shanell     Hypertension Sister Shanell     Diabetes Paternal Aunt Ceci     Hypertension Sister Shanna     Thyroid disease Neg Hx     [4]   Allergies  Allergen Reactions    Cortisone Nausea Only and Bradycardia     Flushed face as well     Ciprofloxacin Rash    Medical Tape Blisters, Itching and Rash     Paper tape tolerated    If on too long redness => blisters    Other Allergic Rhinitis     Nasal symptoms   [5]   Current Outpatient Medications:     buPROPion (WELLBUTRIN XL) 300 mg 24 hr tablet, Take 300 mg by mouth in the morning., Disp: , Rfl:     cholecalciferol (VITAMIN D3) 1,000 units tablet, Take 2,000 Units by mouth in the morning., Disp: , Rfl:     famotidine (PEPCID) 20 mg tablet, , Disp: , Rfl:     lisinopril-hydrochlorothiazide (PRINZIDE,ZESTORETIC) 10-12.5 MG per tablet, Take 1 tablet by mouth in the morning., Disp: , Rfl:     Multiple Vitamins-Minerals (Centrum Adult 50+ MultiGummies) CHEW, , Disp: , Rfl:     ondansetron (ZOFRAN) 4 mg tablet, Take 4 mg by mouth daily as needed, Disp: , Rfl:     tirzepatide (Zepbound) 5 mg/0.5 mL auto-injector, Inject 0.5 mL (5 mg total) under the skin once a week, Disp: 2 mL, Rfl: 4    traZODone (DESYREL) 150 mg tablet, Take 150 mg by mouth daily at bedtime, Disp: , Rfl:     meloxicam (Mobic) 15 mg tablet, Take 1 tablet (15 mg total) by mouth daily, Disp: 30 tablet, Rfl: 1

## 2025-06-10 ENCOUNTER — OFFICE VISIT (OUTPATIENT)
Dept: BARIATRICS | Facility: CLINIC | Age: 60
End: 2025-06-10
Payer: COMMERCIAL

## 2025-06-10 VITALS
DIASTOLIC BLOOD PRESSURE: 68 MMHG | RESPIRATION RATE: 16 BRPM | SYSTOLIC BLOOD PRESSURE: 110 MMHG | WEIGHT: 187.8 LBS | TEMPERATURE: 97.7 F | HEART RATE: 84 BPM | BODY MASS INDEX: 31.29 KG/M2 | HEIGHT: 65 IN

## 2025-06-10 DIAGNOSIS — F41.9 ANXIETY AND DEPRESSION: ICD-10-CM

## 2025-06-10 DIAGNOSIS — F32.A ANXIETY AND DEPRESSION: ICD-10-CM

## 2025-06-10 DIAGNOSIS — E78.5 HYPERLIPIDEMIA, UNSPECIFIED HYPERLIPIDEMIA TYPE: ICD-10-CM

## 2025-06-10 DIAGNOSIS — E66.811 CLASS 1 OBESITY WITH BODY MASS INDEX (BMI) OF 34.0 TO 34.9 IN ADULT: Primary | ICD-10-CM

## 2025-06-10 PROCEDURE — 99214 OFFICE O/P EST MOD 30 MIN: CPT | Performed by: STUDENT IN AN ORGANIZED HEALTH CARE EDUCATION/TRAINING PROGRAM

## 2025-06-10 RX ORDER — TIRZEPATIDE 7.5 MG/.5ML
7.5 INJECTION, SOLUTION SUBCUTANEOUS WEEKLY
Qty: 2 ML | Refills: 3 | Status: SHIPPED | OUTPATIENT
Start: 2025-06-10

## 2025-06-10 NOTE — PROGRESS NOTES
Assessment & Plan  Class 1 obesity    Initial weight: 246  Previous weight: 199   Current weight: 187    TBW loss%: 23    Medication: Zepbound 5mg, increased 7.5 mg     Patient understands the importance of making lifestyle changes as recommended below to aid in weight loss.      Dietary Recommendations:  Recommend to avoid skipping any meals. Adequate amount of Macronutrients (minimal 3 meals a day) is necessary to help improve metabolism, satiety and allow for better portion control by decreasing Ghrelin (hunger hormone). Lack of hunger can be suppressed by a hormone called Leptin (full hormone) which can occur from a previous meal or caffeine intake    Protein intake throughout the day can help promote satiety and is necessary for muscle growth/repair    Carbohydrates are essential as it is the vital source of fuel for daily activities. energy, cell function, nutrient absorption, and hormone production.     Fats: Essential vitamins like A, D, and E, support cell growth, function and are necessary for nutrient absorption to support your organs     Fluid intake which is at least half your body weight in ounces is necessary to help control cravings (decreasing confusion for appetite vs water deprivation) as the human body is made up of 50-70% of Fluids. If there is a diversion for water alone, would recommend flavored water (example-splash of lemonade or ice tea) to help promote compliance. Fluids include Teas, water, flavored water, seltzer water, coffee, shakes    Metabolism:    Metabolism can also be promoted by macronutrient intake and increased muscle weight via thermogenesis      Daily Calorie Needs: Recommend to take into account any fluid losses and calories burned via increased activity levels as daily calories may need to be adjusted     Weight check: Weights can fluctuate depending on fluid shifts vs what foods are consumed prior to checking your weight          Return in about 4 months (around  "9/26/2025) for followup .     Most recent notes, labs and previous medical records were reviewed. Total time with chart review and with the patient: 35 min      ______________________________________________________________________        Subjective:     Chief Complaint   Patient presents with    Follow-up     Mwm 3m f/u: waist 38in       HPI: 60 y.o. female with pmh of class II obesity, History of GERD, hypertension, hyperlipidemia, Depression  presents for follow-up    Current Medication: Zepbound 5mg      Dietary Regimen:  -3 meals, snack (cereal, yogurt)  Lunch: chicken, boil egg  Dinner: protein based   Snacks: fruits   Fluids: 40oz x 4      Occupation: Accounting     Review Of Systems:  General: No pallor  Pulmonary: Negative for shortness of breath  Chest: negative for chest pain  Gastrointestinal:  Negative for vomiting   Psychiatric/Behavioral:  Negative for behavioral problems, confusion, dysphoric mood and hallucinations.    All other systems reviewed and are negative.     Objective:  /68   Pulse 84   Temp 97.7 °F (36.5 °C)   Resp 16   Ht 5' 5\" (1.651 m)   Wt 85.2 kg (187 lb 12.8 oz)   BMI 31.25 kg/m²     Wt Readings from Last 30 Encounters:   06/10/25 85.2 kg (187 lb 12.8 oz)   06/05/25 84.4 kg (186 lb)   03/10/25 90.5 kg (199 lb 9.6 oz)   11/19/24 112 kg (246 lb 14.6 oz)   10/31/24 102 kg (224 lb)   09/19/24 102 kg (224 lb)   06/25/24 102 kg (225 lb)   04/19/24 97.5 kg (215 lb)   04/10/24 97.5 kg (215 lb)   04/01/24 97.5 kg (215 lb)   03/20/24 97.5 kg (215 lb)   02/26/24 93.4 kg (206 lb)   02/20/24 92.1 kg (203 lb)   07/06/23 92.1 kg (203 lb)   05/18/22 95.3 kg (210 lb 1.6 oz)   02/24/21 85.7 kg (189 lb)       Physical Exam  Constitutional:       General: No acute distress.  Well-nourished  HENT:      Head: Normocephalic and atraumatic.   Eyes:      Extraocular Movements: Extraocular movements intact.      Conjunctiva/pupils: Conjunctivae normal. Pupils are equal, round  Pulmonary:      " Effort: Pulmonary effort is normal. No labored breathing   Neurological:      General: No focal deficit present.  AO x 3     Mental Status: Alert and oriented to person, place, and time. Mental status is at baseline.   Psychiatric:         Mood and Affect: Mood normal.         Behavior: Behavior normal.     Labs and Imaging  Recent labs and imaging have been personally reviewed.

## 2025-06-24 ENCOUNTER — TELEPHONE (OUTPATIENT)
Dept: BARIATRICS | Facility: CLINIC | Age: 60
End: 2025-06-24

## 2025-06-24 NOTE — TELEPHONE ENCOUNTER
PA for zepbound 7.5mgSUBMITTED to [employer]    via    []CMM-KEY: [id number]  []Surescripts-Case ID #   []Availity-Auth ID # NDC #   []Faxed to plan   []Other website   []Phone call Case ID #     []PA sent as URGENT    All office notes, labs and other pertaining documents and studies sent. Clinical questions answered. Awaiting determination from insurance company.     Turnaround time for your insurance to make a decision on your Prior Authorization can take 7-21 business days.

## 2025-06-25 NOTE — TELEPHONE ENCOUNTER
PA for zepbound 7.5mg APPROVED     Date(s) approved thru 06/24/2026    Case #    Patient advised by          [x][employer] Message  []Phone call   []LMOM  []L/M to call office as no active Communication consent on file  []Unable to leave detailed message as VM not approved on Communication consent       Pharmacy advised by    [x]Fax  []Phone call  []Secure Chat    Specialty Pharmacy    []     Approval letter scanned into [employer] No in cover my meds

## (undated) DEVICE — CRADLE EXTREMITY UNIVERSAL CONTOURED

## (undated) DEVICE — 1820 FOAM BLOCK NEEDLE COUNTER: Brand: DEVON

## (undated) DEVICE — SUT VICRYL 2-0 CT-1 27 IN J259H

## (undated) DEVICE — STEINMANN PIN, SMOOTH
Type: IMPLANTABLE DEVICE | Site: FOOT | Status: NON-FUNCTIONAL
Brand: VARIAX
Removed: 2024-02-26

## (undated) DEVICE — ACE WRAP 6 IN UNSTERILE

## (undated) DEVICE — SPLINT 5 X 30 IN XFAST SET PLASTER

## (undated) DEVICE — SPONGE 4 X 4 XRAY 16 PLY STRL LF RFD

## (undated) DEVICE — NEEDLE 25G X 1 1/2

## (undated) DEVICE — ABDOMINAL PAD: Brand: DERMACEA

## (undated) DEVICE — INTENDED FOR TISSUE SEPARATION, AND OTHER PROCEDURES THAT REQUIRE A SHARP SURGICAL BLADE TO PUNCTURE OR CUT.: Brand: BARD-PARKER ® CARBON RIB-BACK BLADES

## (undated) DEVICE — CAST PADDING 6 IN STERILE

## (undated) DEVICE — GAUZE SPONGES,16 PLY: Brand: CURITY

## (undated) DEVICE — GLOVE SRG BIOGEL 6

## (undated) DEVICE — TUBING SUCTION 5MM X 12 FT

## (undated) DEVICE — CHLORAPREP HI-LITE 26ML ORANGE

## (undated) DEVICE — THIN OFFSET (13.3 X 0.38 X 42.0MM)

## (undated) DEVICE — GLOVE SRG BIOGEL 7

## (undated) DEVICE — NEPTUNE E-SEP SMOKE EVACUATION PENCIL, COATED, 70MM BLADE, PUSH BUTTON SWITCH: Brand: NEPTUNE E-SEP

## (undated) DEVICE — SPECIMEN CONTAINER STERILE PEEL PACK

## (undated) DEVICE — SUT ETHIBOND 0 SH/SH 36 IN X524H

## (undated) DEVICE — TIBURON SPLIT SHEET: Brand: CONVERTORS

## (undated) DEVICE — CAST PADDING 6IN UNSTERILE

## (undated) DEVICE — SPONGE SCRUB 4 PCT CHLORHEXIDINE

## (undated) DEVICE — GLOVE INDICATOR PI UNDERGLOVE SZ 7.5 BLUE

## (undated) DEVICE — SUT ETHILON 3-0 PS-1 18 IN 1663H

## (undated) DEVICE — GLOVE INDICATOR PI UNDERGLOVE SZ 8 BLUE

## (undated) DEVICE — GLOVE SRG BIOGEL 8

## (undated) DEVICE — ELECTRODE NEEDLE MOD E-Z CLEAN 2.75IN 7CM -0013M

## (undated) DEVICE — STERILE POLYISOPRENE POWDER-FREE SURGICAL GLOVES: Brand: PROTEXIS

## (undated) DEVICE — SYRINGE 10ML LL CONTROL TOP

## (undated) DEVICE — BASIC PACK: Brand: CONVERTORS

## (undated) DEVICE — INTENDED FOR TISSUE SEPARATION, AND OTHER PROCEDURES THAT REQUIRE A SHARP SURGICAL BLADE TO PUNCTURE OR CUT.: Brand: BARD-PARKER SAFETY BLADES SIZE 15, STERILE

## (undated) DEVICE — TENODESIS: Brand: G-FORCE

## (undated) DEVICE — OCCLUSIVE GAUZE STRIP,3% BISMUTH TRIBROMOPHENATE IN PETROLATUM BLEND: Brand: XEROFORM

## (undated) DEVICE — STANDARD SURGICAL GOWN, L: Brand: CONVERTORS

## (undated) DEVICE — SCD SEQUENTIAL COMPRESSION COMFORT SLEEVE MEDIUM KNEE LENGTH: Brand: KENDALL SCD

## (undated) DEVICE — CUFF TOURNIQUET 30 X 4 IN QUICK CONNECT DISP 1BLA

## (undated) DEVICE — GLOVE SRG BIOGEL 7.5

## (undated) DEVICE — CANNULATED DRILL: Brand: FIXOS

## (undated) DEVICE — GLOVE INDICATOR PI UNDERGLOVE SZ 6.5 BLUE

## (undated) DEVICE — CABLE BIPOLAR DISP MEGADYNE

## (undated) DEVICE — TENODESIS SYSTEM HEX DRIVER: Brand: G-FORCE

## (undated) DEVICE — MEDI-VAC YANKAUER SUCTION HANDLE W/BULBOUS AND CONTROL VENT: Brand: CARDINAL HEALTH

## (undated) DEVICE — SYRINGE 30ML LL

## (undated) DEVICE — NEEDLE BLUNT 18 G X 1 1/2IN

## (undated) DEVICE — SUT ETHILON 4-0 P-S 18 IN 699H

## (undated) DEVICE — LIGHT GLOVE GREEN

## (undated) DEVICE — INTENT TO BE USED WITH SUTURE MATERIAL FOR TISSUE CLOSURE: Brand: RICHARD-ALLAN® NEEDLE 1/2 CIRCLE TAPER

## (undated) DEVICE — BETHLEHEM UNIV MAJ EXT ,KIT: Brand: CARDINAL HEALTH

## (undated) DEVICE — SINGLE PORT MANIFOLD: Brand: NEPTUNE 2

## (undated) DEVICE — SKIN MARKER DUAL TIP WITH RULER CAP, FLEXIBLE RULER AND LABELS: Brand: DEVON

## (undated) DEVICE — BANDAGE, ESMARK LF STR 6"X9' (20/CS): Brand: CYPRESS

## (undated) DEVICE — UNTHREADED GUIDE WIRE: Brand: FIXOS

## (undated) DEVICE — SUT VICRYL 4-0 PS-2 18 IN J496G

## (undated) DEVICE — ASTOUND STANDARD SURGICAL GOWN, XL: Brand: CONVERTORS